# Patient Record
Sex: FEMALE | Race: WHITE | Employment: OTHER | URBAN - METROPOLITAN AREA
[De-identification: names, ages, dates, MRNs, and addresses within clinical notes are randomized per-mention and may not be internally consistent; named-entity substitution may affect disease eponyms.]

---

## 2021-01-20 ENCOUNTER — IMMUNIZATION (OUTPATIENT)
Dept: PRIMARY CARE CLINIC | Age: 86
End: 2021-01-20
Payer: MEDICARE

## 2021-01-20 PROCEDURE — 0001A COVID-19, PFIZER VACCINE 30MCG/0.3ML DOSE: CPT | Performed by: NURSE PRACTITIONER

## 2021-01-20 PROCEDURE — 91300 COVID-19, PFIZER VACCINE 30MCG/0.3ML DOSE: CPT | Performed by: NURSE PRACTITIONER

## 2021-02-10 ENCOUNTER — IMMUNIZATION (OUTPATIENT)
Dept: PRIMARY CARE CLINIC | Age: 86
End: 2021-02-10
Payer: MEDICARE

## 2021-02-10 PROCEDURE — 91300 COVID-19, PFIZER VACCINE 30MCG/0.3ML DOSE: CPT | Performed by: NURSE PRACTITIONER

## 2021-02-10 PROCEDURE — 0002A COVID-19, PFIZER VACCINE 30MCG/0.3ML DOSE: CPT | Performed by: NURSE PRACTITIONER

## 2021-04-02 ENCOUNTER — HOSPITAL ENCOUNTER (INPATIENT)
Age: 86
LOS: 6 days | Discharge: NURSING, REHAB OR LONG-TERM CARE | DRG: 481 | End: 2021-04-08
Attending: EMERGENCY MEDICINE | Admitting: INTERNAL MEDICINE
Payer: MEDICARE

## 2021-04-02 ENCOUNTER — APPOINTMENT (OUTPATIENT)
Dept: GENERAL RADIOLOGY | Age: 86
DRG: 481 | End: 2021-04-02
Payer: MEDICARE

## 2021-04-02 DIAGNOSIS — S72.002A CLOSED LEFT HIP FRACTURE, INITIAL ENCOUNTER (HCC): ICD-10-CM
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
## 2021-04-02 DIAGNOSIS — S72.002A CLOSED FRACTURE OF LEFT HIP, INITIAL ENCOUNTER (HCC): ICD-10-CM

## 2021-04-02 LAB
ABO/RH: NORMAL
ALBUMIN SERPL-MCNC: 4 G/DL (ref 3.5–5.2)
ALP BLD-CCNC: 51 U/L (ref 35–104)
ALT SERPL-CCNC: 11 U/L (ref 0–32)
ANION GAP SERPL CALCULATED.3IONS-SCNC: 10 MMOL/L (ref 7–16)
ANTIBODY SCREEN: NORMAL
APTT: 36.7 SEC (ref 24.5–35.1)
AST SERPL-CCNC: 16 U/L (ref 0–31)
BASOPHILS ABSOLUTE: 0.05 E9/L (ref 0–0.2)
BASOPHILS RELATIVE PERCENT: 0.6 % (ref 0–2)
BILIRUB SERPL-MCNC: 0.4 MG/DL (ref 0–1.2)
BUN BLDV-MCNC: 11 MG/DL (ref 8–23)
CALCIUM SERPL-MCNC: 8.5 MG/DL (ref 8.6–10.2)
CHLORIDE BLD-SCNC: 104 MMOL/L (ref 98–107)
CO2: 24 MMOL/L (ref 22–29)
CREAT SERPL-MCNC: 0.8 MG/DL (ref 0.5–1)
EOSINOPHILS ABSOLUTE: 0.19 E9/L (ref 0.05–0.5)
EOSINOPHILS RELATIVE PERCENT: 2.3 % (ref 0–6)
GFR AFRICAN AMERICAN: >60
GFR NON-AFRICAN AMERICAN: >60 ML/MIN/1.73
GLUCOSE BLD-MCNC: 125 MG/DL (ref 74–99)
HCT VFR BLD CALC: 37.4 % (ref 34–48)
HEMOGLOBIN: 12.2 G/DL (ref 11.5–15.5)
IMMATURE GRANULOCYTES #: 0.02 E9/L
IMMATURE GRANULOCYTES %: 0.2 % (ref 0–5)
INR BLD: 1.4
LYMPHOCYTES ABSOLUTE: 1.01 E9/L (ref 1.5–4)
LYMPHOCYTES RELATIVE PERCENT: 12 % (ref 20–42)
MCH RBC QN AUTO: 30.7 PG (ref 26–35)
MCHC RBC AUTO-ENTMCNC: 32.6 % (ref 32–34.5)
MCV RBC AUTO: 94.2 FL (ref 80–99.9)
MONOCYTES ABSOLUTE: 0.79 E9/L (ref 0.1–0.95)
MONOCYTES RELATIVE PERCENT: 9.4 % (ref 2–12)
NEUTROPHILS ABSOLUTE: 6.35 E9/L (ref 1.8–7.3)
NEUTROPHILS RELATIVE PERCENT: 75.5 % (ref 43–80)
PDW BLD-RTO: 13.1 FL (ref 11.5–15)
PLATELET # BLD: 157 E9/L (ref 130–450)
PMV BLD AUTO: 10.6 FL (ref 7–12)
POTASSIUM REFLEX MAGNESIUM: 4.1 MMOL/L (ref 3.5–5)
PROTHROMBIN TIME: 15.2 SEC (ref 9.3–12.4)
RBC # BLD: 3.97 E12/L (ref 3.5–5.5)
REASON FOR REJECTION: NORMAL
REJECTED TEST: NORMAL
SODIUM BLD-SCNC: 138 MMOL/L (ref 132–146)
TOTAL PROTEIN: 6.9 G/DL (ref 6.4–8.3)
WBC # BLD: 8.4 E9/L (ref 4.5–11.5)

## 2021-04-02 PROCEDURE — 36415 COLL VENOUS BLD VENIPUNCTURE: CPT

## 2021-04-02 PROCEDURE — 73502 X-RAY EXAM HIP UNI 2-3 VIEWS: CPT

## 2021-04-02 PROCEDURE — 86901 BLOOD TYPING SEROLOGIC RH(D): CPT

## 2021-04-02 PROCEDURE — 99283 EMERGENCY DEPT VISIT LOW MDM: CPT

## 2021-04-02 PROCEDURE — 86850 RBC ANTIBODY SCREEN: CPT

## 2021-04-02 PROCEDURE — 6370000000 HC RX 637 (ALT 250 FOR IP): Performed by: INTERNAL MEDICINE

## 2021-04-02 PROCEDURE — 1200000000 HC SEMI PRIVATE

## 2021-04-02 PROCEDURE — 80053 COMPREHEN METABOLIC PANEL: CPT

## 2021-04-02 PROCEDURE — 2580000003 HC RX 258: Performed by: INTERNAL MEDICINE

## 2021-04-02 PROCEDURE — 85730 THROMBOPLASTIN TIME PARTIAL: CPT

## 2021-04-02 PROCEDURE — 71045 X-RAY EXAM CHEST 1 VIEW: CPT

## 2021-04-02 PROCEDURE — 85025 COMPLETE CBC W/AUTO DIFF WBC: CPT

## 2021-04-02 PROCEDURE — 86900 BLOOD TYPING SEROLOGIC ABO: CPT

## 2021-04-02 PROCEDURE — 73552 X-RAY EXAM OF FEMUR 2/>: CPT

## 2021-04-02 PROCEDURE — 6370000000 HC RX 637 (ALT 250 FOR IP): Performed by: NURSE PRACTITIONER

## 2021-04-02 PROCEDURE — 85610 PROTHROMBIN TIME: CPT

## 2021-04-02 RX ORDER — POLYETHYLENE GLYCOL 3350 17 G/17G
17 POWDER, FOR SOLUTION ORAL DAILY PRN
Status: DISCONTINUED | OUTPATIENT
Start: 2021-04-02 | End: 2021-04-08 | Stop reason: HOSPADM

## 2021-04-02 RX ORDER — SODIUM CHLORIDE 9 MG/ML
25 INJECTION, SOLUTION INTRAVENOUS PRN
Status: DISCONTINUED | OUTPATIENT
Start: 2021-04-02 | End: 2021-04-08 | Stop reason: HOSPADM

## 2021-04-02 RX ORDER — ACETAMINOPHEN 650 MG/1
650 SUPPOSITORY RECTAL EVERY 6 HOURS PRN
Status: DISCONTINUED | OUTPATIENT
Start: 2021-04-02 | End: 2021-04-03 | Stop reason: SDUPTHER

## 2021-04-02 RX ORDER — LANOLIN ALCOHOL/MO/W.PET/CERES
3 CREAM (GRAM) TOPICAL NIGHTLY PRN
Status: DISCONTINUED | OUTPATIENT
Start: 2021-04-02 | End: 2021-04-08 | Stop reason: HOSPADM

## 2021-04-02 RX ORDER — PROMETHAZINE HYDROCHLORIDE 25 MG/1
12.5 TABLET ORAL EVERY 6 HOURS PRN
Status: DISCONTINUED | OUTPATIENT
Start: 2021-04-02 | End: 2021-04-08 | Stop reason: HOSPADM

## 2021-04-02 RX ORDER — SODIUM CHLORIDE 0.9 % (FLUSH) 0.9 %
10 SYRINGE (ML) INJECTION EVERY 12 HOURS SCHEDULED
Status: DISCONTINUED | OUTPATIENT
Start: 2021-04-02 | End: 2021-04-08 | Stop reason: HOSPADM

## 2021-04-02 RX ORDER — OXYCODONE HYDROCHLORIDE 5 MG/1
5 TABLET ORAL EVERY 4 HOURS PRN
Status: DISCONTINUED | OUTPATIENT
Start: 2021-04-02 | End: 2021-04-03

## 2021-04-02 RX ORDER — ONDANSETRON 2 MG/ML
4 INJECTION INTRAMUSCULAR; INTRAVENOUS EVERY 6 HOURS PRN
Status: DISCONTINUED | OUTPATIENT
Start: 2021-04-02 | End: 2021-04-08 | Stop reason: HOSPADM

## 2021-04-02 RX ORDER — MORPHINE SULFATE 2 MG/ML
2 INJECTION, SOLUTION INTRAMUSCULAR; INTRAVENOUS EVERY 4 HOURS PRN
Status: DISCONTINUED | OUTPATIENT
Start: 2021-04-02 | End: 2021-04-08 | Stop reason: HOSPADM

## 2021-04-02 RX ORDER — SODIUM CHLORIDE 0.9 % (FLUSH) 0.9 %
10 SYRINGE (ML) INJECTION PRN
Status: DISCONTINUED | OUTPATIENT
Start: 2021-04-02 | End: 2021-04-08 | Stop reason: HOSPADM

## 2021-04-02 RX ORDER — ACETAMINOPHEN 325 MG/1
650 TABLET ORAL EVERY 6 HOURS PRN
Status: DISCONTINUED | OUTPATIENT
Start: 2021-04-02 | End: 2021-04-03 | Stop reason: SDUPTHER

## 2021-04-02 RX ORDER — LEVOTHYROXINE SODIUM 0.12 MG/1
62.5 TABLET ORAL DAILY
COMMUNITY
End: 2021-10-18

## 2021-04-02 RX ADMIN — ACETAMINOPHEN 650 MG: 325 TABLET ORAL at 22:29

## 2021-04-02 RX ADMIN — Medication 3 MG: at 22:29

## 2021-04-02 RX ADMIN — Medication 10 ML: at 22:32

## 2021-04-02 ASSESSMENT — PAIN DESCRIPTION - LOCATION: LOCATION: HIP

## 2021-04-02 ASSESSMENT — PAIN DESCRIPTION - FREQUENCY: FREQUENCY: INTERMITTENT

## 2021-04-02 ASSESSMENT — PAIN DESCRIPTION - PROGRESSION: CLINICAL_PROGRESSION: GRADUALLY WORSENING

## 2021-04-02 ASSESSMENT — PAIN DESCRIPTION - ORIENTATION: ORIENTATION: LEFT

## 2021-04-02 ASSESSMENT — PAIN DESCRIPTION - DESCRIPTORS: DESCRIPTORS: ACHING;DULL;DISCOMFORT

## 2021-04-02 ASSESSMENT — PAIN SCALES - GENERAL: PAINLEVEL_OUTOF10: 2

## 2021-04-02 NOTE — ED PROVIDER NOTES
Department of Emergency Medicine   ED  Provider Note  Admit Date/RoomTime: 4/2/2021  5:43 PM  ED Room: [facility]          History of Present Illness:  4/2/21, Time: 7:17 PM EDT  Chief Complaint   Patient presents with    Fall    Leg Swelling     fell at home,swelling and deformity,shortening to left upper leg                Chanda Mcguire is a 82 y.o. female presenting to the ED for fall. Patient mechanical fall at home. She did not hit her head, she did not lose conscious. She is on no anticoagulation. She does complain of a left hip pain, sharp, worse when she moves, improves with rest, does not rating her. EMS reports a deformity. She has not broken a bone before. Denies head pain, neck pain, nausea, vomit, fever, chills, cough, sputum, change in bowel or bladder, or any other symptoms or complaints. Review of Systems:   Pertinent positives and negatives are stated within HPI, all other systems reviewed and are negative.        --------------------------------------------- PAST HISTORY ---------------------------------------------  Past Medical History:  has no past medical history on file. Past Surgical History:  has no past surgical history on file. Social History:      Family History: family history is not on file. . Unless otherwise noted, family history is non contributory    The patients home medications have been reviewed. Allergies:  Albumen, egg; Doxycycline; Penicillins; and Sulfamethoxazole-trimethoprim        ---------------------------------------------------PHYSICAL EXAM--------------------------------------    Constitutional/General: Alert and oriented x3  Head: Normocephalic and atraumatic  Eyes: PERRL, EOMI, sclera non icteric  Mouth: Oropharynx clear, handling secretions, no trismus, no asymmetry of the posterior oropharynx or uvular edema  Neck: Supple, full ROM, no stridor, no meningeal signs  Respiratory: Lungs clear to auscultation bilaterally, no wheezes, rales, or rhonchi. Not in respiratory distress  Cardiovascular:  Regular rate. Regular rhythm. 2+ distal pulses. Equal extremity pulses. Chest: No chest wall tenderness  GI:  Abdomen Soft, Non tender, Non distended. No rebound, guarding, or rigidity. No pulsatile masses. Musculoskeletal: Moves all extremities x 3. Left lower extremity shortened externally rotated, dorsalis pedis 2+  Integument: skin warm and dry. No rashes. Neurologic: GCS 15, no focal deficits, symmetric strength 5/5 in the upper and lower extremities bilaterally  Psychiatric: Normal Affect          -------------------------------------------------- RESULTS -------------------------------------------------  I have personally reviewed all laboratory and imaging results for this patient. Results are listed below.      LABS: (Lab results interpreted by me)  Results for orders placed or performed during the hospital encounter of 04/02/21   CBC Auto Differential   Result Value Ref Range    WBC 8.4 4.5 - 11.5 E9/L    RBC 3.97 3.50 - 5.50 E12/L    Hemoglobin 12.2 11.5 - 15.5 g/dL    Hematocrit 37.4 34.0 - 48.0 %    MCV 94.2 80.0 - 99.9 fL    MCH 30.7 26.0 - 35.0 pg    MCHC 32.6 32.0 - 34.5 %    RDW 13.1 11.5 - 15.0 fL    Platelets 420 380 - 595 E9/L    MPV 10.6 7.0 - 12.0 fL    Neutrophils % 75.5 43.0 - 80.0 %    Immature Granulocytes % 0.2 0.0 - 5.0 %    Lymphocytes % 12.0 (L) 20.0 - 42.0 %    Monocytes % 9.4 2.0 - 12.0 %    Eosinophils % 2.3 0.0 - 6.0 %    Basophils % 0.6 0.0 - 2.0 %    Neutrophils Absolute 6.35 1.80 - 7.30 E9/L    Immature Granulocytes # 0.02 E9/L    Lymphocytes Absolute 1.01 (L) 1.50 - 4.00 E9/L    Monocytes Absolute 0.79 0.10 - 0.95 E9/L    Eosinophils Absolute 0.19 0.05 - 0.50 E9/L    Basophils Absolute 0.05 0.00 - 0.20 E9/L   Protime-INR   Result Value Ref Range    Protime 15.2 (H) 9.3 - 12.4 sec    INR 1.4    APTT   Result Value Ref Range    aPTT 36.7 (H) 24.5 - 35.1 sec   SPECIMEN REJECTION   Result Value Ref Range    Rejected Test cmp     Reason for Rejection see below    ,       RADIOLOGY:  Interpreted by Radiologist unless otherwise specified  XR HIP LEFT (2-3 VIEWS)   Final Result   1. Intratrochanteric fracture of the left femoral head/neck region. 2.  Mild left hip and left knee osteoarthritis. 3.  Osseous mineralization is decreased. XR FEMUR LEFT (MIN 2 VIEWS)   Final Result   1. Intratrochanteric fracture of the left femoral head/neck region. 2.  Mild left hip and left knee osteoarthritis. 3.  Osseous mineralization is decreased. XR CHEST PORTABLE   Final Result   Linear platelike densities at the left lung base, favor subsegmental   atelectasis, assuming the absence of infectious symptoms. EKG Interpretation  Interpreted by emergency department physician, Dr. Daysi Middleton         ------------------------- NURSING NOTES AND VITALS REVIEWED ---------------------------   The nursing notes within the ED encounter and vital signs as below have been reviewed by myself  BP (!) 140/71   Pulse 73   Temp 97.6 °F (36.4 °C) (Oral)   Resp 20   Ht 5' (1.524 m)   Wt 135 lb (61.2 kg)   SpO2 97%   BMI 26.37 kg/m²     Oxygen Saturation Interpretation: Normal    The patients available past medical records and past encounters were reviewed. ------------------------------ ED COURSE/MEDICAL DECISION MAKING----------------------  Medications - No data to display          Medical Decision Making:    Labs and imaging reviewed. Orthopedics consulted, patient will be admitted. Counseling: The emergency provider has spoken with the patient and discussed todays results, in addition to providing specific details for the plan of care and counseling regarding the diagnosis and prognosis. Questions are answered at this time and they are agreeable with the plan.       --------------------------------- IMPRESSION AND DISPOSITION ---------------------------------    IMPRESSION  1.  Fall, initial encounter    2. Closed fracture of left hip, initial encounter ([facility] [address]        DISPOSITION  Disposition: Admit to med/surg floor  Patient condition is stable        NOTE: This report was transcribed using voice recognition software.  Every effort was made to ensure accuracy; however, inadvertent computerized transcription errors may be present        Naveed Uribe MD  04/03/21 8855

## 2021-04-02 NOTE — ED NOTES
Bed: 36  Expected date:   Expected time:   Means of arrival:   Comments:  Raheel Espana RN  04/02/21 7321

## 2021-04-03 ENCOUNTER — ANESTHESIA (OUTPATIENT)
Dept: OPERATING ROOM | Age: 86
DRG: 481 | End: 2021-04-03
Payer: MEDICARE

## 2021-04-03 ENCOUNTER — APPOINTMENT (OUTPATIENT)
Dept: GENERAL RADIOLOGY | Age: 86
DRG: 481 | End: 2021-04-03
Payer: MEDICARE

## 2021-04-03 ENCOUNTER — ANESTHESIA EVENT (OUTPATIENT)
Dept: OPERATING ROOM | Age: 86
DRG: 481 | End: 2021-04-03
Payer: MEDICARE

## 2021-04-03 VITALS — OXYGEN SATURATION: 100 % | SYSTOLIC BLOOD PRESSURE: 136 MMHG | DIASTOLIC BLOOD PRESSURE: 101 MMHG | TEMPERATURE: 97.3 F

## 2021-04-03 PROBLEM — E03.9 HYPOTHYROID: Chronic | Status: ACTIVE | Noted: 2021-04-03

## 2021-04-03 LAB
ANION GAP SERPL CALCULATED.3IONS-SCNC: 11 MMOL/L (ref 7–16)
BASOPHILS ABSOLUTE: 0.02 E9/L (ref 0–0.2)
BASOPHILS RELATIVE PERCENT: 0.2 % (ref 0–2)
BUN BLDV-MCNC: 14 MG/DL (ref 8–23)
CALCIUM SERPL-MCNC: 9 MG/DL (ref 8.6–10.2)
CHLORIDE BLD-SCNC: 103 MMOL/L (ref 98–107)
CO2: 24 MMOL/L (ref 22–29)
CREAT SERPL-MCNC: 0.8 MG/DL (ref 0.5–1)
EOSINOPHILS ABSOLUTE: 0 E9/L (ref 0.05–0.5)
EOSINOPHILS RELATIVE PERCENT: 0 % (ref 0–6)
GFR AFRICAN AMERICAN: >60
GFR NON-AFRICAN AMERICAN: >60 ML/MIN/1.73
GLUCOSE BLD-MCNC: 129 MG/DL (ref 74–99)
HCT VFR BLD CALC: 32.9 % (ref 34–48)
HEMOGLOBIN: 11.1 G/DL (ref 11.5–15.5)
IMMATURE GRANULOCYTES #: 0.06 E9/L
IMMATURE GRANULOCYTES %: 0.5 % (ref 0–5)
LYMPHOCYTES ABSOLUTE: 0.89 E9/L (ref 1.5–4)
LYMPHOCYTES RELATIVE PERCENT: 7.3 % (ref 20–42)
MCH RBC QN AUTO: 31.8 PG (ref 26–35)
MCHC RBC AUTO-ENTMCNC: 33.7 % (ref 32–34.5)
MCV RBC AUTO: 94.3 FL (ref 80–99.9)
MONOCYTES ABSOLUTE: 1.11 E9/L (ref 0.1–0.95)
MONOCYTES RELATIVE PERCENT: 9.1 % (ref 2–12)
NEUTROPHILS ABSOLUTE: 10.16 E9/L (ref 1.8–7.3)
NEUTROPHILS RELATIVE PERCENT: 82.9 % (ref 43–80)
PDW BLD-RTO: 12.8 FL (ref 11.5–15)
PLATELET # BLD: 175 E9/L (ref 130–450)
PMV BLD AUTO: 10.4 FL (ref 7–12)
POTASSIUM REFLEX MAGNESIUM: 4.3 MMOL/L (ref 3.5–5)
RBC # BLD: 3.49 E12/L (ref 3.5–5.5)
SODIUM BLD-SCNC: 138 MMOL/L (ref 132–146)
VITAMIN D 25-HYDROXY: 20 NG/ML (ref 30–100)
WBC # BLD: 12.2 E9/L (ref 4.5–11.5)

## 2021-04-03 PROCEDURE — 2500000003 HC RX 250 WO HCPCS: Performed by: ORTHOPAEDIC SURGERY

## 2021-04-03 PROCEDURE — 82306 VITAMIN D 25 HYDROXY: CPT

## 2021-04-03 PROCEDURE — 27245 TREAT THIGH FRACTURE: CPT | Performed by: ORTHOPAEDIC SURGERY

## 2021-04-03 PROCEDURE — 99222 1ST HOSP IP/OBS MODERATE 55: CPT | Performed by: ORTHOPAEDIC SURGERY

## 2021-04-03 PROCEDURE — 2709999900 HC NON-CHARGEABLE SUPPLY: Performed by: ORTHOPAEDIC SURGERY

## 2021-04-03 PROCEDURE — 2580000003 HC RX 258: Performed by: INTERNAL MEDICINE

## 2021-04-03 PROCEDURE — 7100000000 HC PACU RECOVERY - FIRST 15 MIN: Performed by: ORTHOPAEDIC SURGERY

## 2021-04-03 PROCEDURE — 3600000005 HC SURGERY LEVEL 5 BASE: Performed by: ORTHOPAEDIC SURGERY

## 2021-04-03 PROCEDURE — 3700000000 HC ANESTHESIA ATTENDED CARE: Performed by: ORTHOPAEDIC SURGERY

## 2021-04-03 PROCEDURE — 36415 COLL VENOUS BLD VENIPUNCTURE: CPT

## 2021-04-03 PROCEDURE — 2580000003 HC RX 258: Performed by: STUDENT IN AN ORGANIZED HEALTH CARE EDUCATION/TRAINING PROGRAM

## 2021-04-03 PROCEDURE — 6360000002 HC RX W HCPCS

## 2021-04-03 PROCEDURE — 3209999900 FLUORO FOR SURGICAL PROCEDURES

## 2021-04-03 PROCEDURE — 2500000003 HC RX 250 WO HCPCS: Performed by: STUDENT IN AN ORGANIZED HEALTH CARE EDUCATION/TRAINING PROGRAM

## 2021-04-03 PROCEDURE — 2580000003 HC RX 258: Performed by: ORTHOPAEDIC SURGERY

## 2021-04-03 PROCEDURE — C1769 GUIDE WIRE: HCPCS | Performed by: ORTHOPAEDIC SURGERY

## 2021-04-03 PROCEDURE — 3600000015 HC SURGERY LEVEL 5 ADDTL 15MIN: Performed by: ORTHOPAEDIC SURGERY

## 2021-04-03 PROCEDURE — 85025 COMPLETE CBC W/AUTO DIFF WBC: CPT

## 2021-04-03 PROCEDURE — 2720000010 HC SURG SUPPLY STERILE: Performed by: ORTHOPAEDIC SURGERY

## 2021-04-03 PROCEDURE — 6370000000 HC RX 637 (ALT 250 FOR IP): Performed by: STUDENT IN AN ORGANIZED HEALTH CARE EDUCATION/TRAINING PROGRAM

## 2021-04-03 PROCEDURE — 80048 BASIC METABOLIC PNL TOTAL CA: CPT

## 2021-04-03 PROCEDURE — C1713 ANCHOR/SCREW BN/BN,TIS/BN: HCPCS | Performed by: ORTHOPAEDIC SURGERY

## 2021-04-03 PROCEDURE — 3700000001 HC ADD 15 MINUTES (ANESTHESIA): Performed by: ORTHOPAEDIC SURGERY

## 2021-04-03 PROCEDURE — 2500000003 HC RX 250 WO HCPCS

## 2021-04-03 PROCEDURE — 6360000002 HC RX W HCPCS: Performed by: STUDENT IN AN ORGANIZED HEALTH CARE EDUCATION/TRAINING PROGRAM

## 2021-04-03 PROCEDURE — 0QS706Z REPOSITION LEFT UPPER FEMUR WITH INTRAMEDULLARY INTERNAL FIXATION DEVICE, OPEN APPROACH: ICD-10-PCS | Performed by: STUDENT IN AN ORGANIZED HEALTH CARE EDUCATION/TRAINING PROGRAM

## 2021-04-03 PROCEDURE — 73502 X-RAY EXAM HIP UNI 2-3 VIEWS: CPT

## 2021-04-03 PROCEDURE — 93005 ELECTROCARDIOGRAM TRACING: CPT | Performed by: ORTHOPAEDIC SURGERY

## 2021-04-03 PROCEDURE — 1200000000 HC SEMI PRIVATE

## 2021-04-03 PROCEDURE — 7100000001 HC PACU RECOVERY - ADDTL 15 MIN: Performed by: ORTHOPAEDIC SURGERY

## 2021-04-03 DEVICE — SCREW BNE L95MM DIA10.35MM PROX FEM G TI CANN FOR TFN ADV: Type: IMPLANTABLE DEVICE | Site: HIP | Status: FUNCTIONAL

## 2021-04-03 DEVICE — SCREW BNE L36MM DIA5MM TIB LT GRN TI ST CANN LOK FULL THRD: Type: IMPLANTABLE DEVICE | Site: HIP | Status: FUNCTIONAL

## 2021-04-03 DEVICE — NAIL IM L170MM DIA11MM NK 125DEG SHT PROX FEM GRN TI-15MO: Type: IMPLANTABLE DEVICE | Site: HIP | Status: FUNCTIONAL

## 2021-04-03 RX ORDER — ONDANSETRON 2 MG/ML
INJECTION INTRAMUSCULAR; INTRAVENOUS PRN
Status: DISCONTINUED | OUTPATIENT
Start: 2021-04-03 | End: 2021-04-03 | Stop reason: SDUPTHER

## 2021-04-03 RX ORDER — SODIUM CHLORIDE, SODIUM LACTATE, POTASSIUM CHLORIDE, CALCIUM CHLORIDE 600; 310; 30; 20 MG/100ML; MG/100ML; MG/100ML; MG/100ML
INJECTION, SOLUTION INTRAVENOUS CONTINUOUS
Status: DISCONTINUED | OUTPATIENT
Start: 2021-04-03 | End: 2021-04-05

## 2021-04-03 RX ORDER — HYDROCODONE BITARTRATE AND ACETAMINOPHEN 5; 325 MG/1; MG/1
2 TABLET ORAL EVERY 4 HOURS PRN
Status: DISCONTINUED | OUTPATIENT
Start: 2021-04-03 | End: 2021-04-08 | Stop reason: HOSPADM

## 2021-04-03 RX ORDER — GLYCOPYRROLATE 1 MG/5 ML
SYRINGE (ML) INTRAVENOUS PRN
Status: DISCONTINUED | OUTPATIENT
Start: 2021-04-03 | End: 2021-04-03 | Stop reason: SDUPTHER

## 2021-04-03 RX ORDER — LEVOTHYROXINE SODIUM 0.12 MG/1
62.5 TABLET ORAL DAILY
Status: DISCONTINUED | OUTPATIENT
Start: 2021-04-04 | End: 2021-04-08 | Stop reason: HOSPADM

## 2021-04-03 RX ORDER — PROMETHAZINE HYDROCHLORIDE 25 MG/ML
6.25 INJECTION, SOLUTION INTRAMUSCULAR; INTRAVENOUS
Status: DISCONTINUED | OUTPATIENT
Start: 2021-04-03 | End: 2021-04-03 | Stop reason: HOSPADM

## 2021-04-03 RX ORDER — HYDROCODONE BITARTRATE AND ACETAMINOPHEN 5; 325 MG/1; MG/1
1 TABLET ORAL EVERY 6 HOURS PRN
Qty: 28 TABLET | Refills: 0 | Status: SHIPPED | OUTPATIENT
Start: 2021-04-03 | End: 2021-04-10

## 2021-04-03 RX ORDER — ACETAMINOPHEN 500 MG
500 TABLET ORAL EVERY 4 HOURS PRN
Status: DISCONTINUED | OUTPATIENT
Start: 2021-04-03 | End: 2021-04-08 | Stop reason: HOSPADM

## 2021-04-03 RX ORDER — FENTANYL CITRATE 50 UG/ML
INJECTION, SOLUTION INTRAMUSCULAR; INTRAVENOUS PRN
Status: DISCONTINUED | OUTPATIENT
Start: 2021-04-03 | End: 2021-04-03 | Stop reason: SDUPTHER

## 2021-04-03 RX ORDER — DEXAMETHASONE SODIUM PHOSPHATE 10 MG/ML
INJECTION INTRAMUSCULAR; INTRAVENOUS PRN
Status: DISCONTINUED | OUTPATIENT
Start: 2021-04-03 | End: 2021-04-03 | Stop reason: SDUPTHER

## 2021-04-03 RX ORDER — ROCURONIUM BROMIDE 10 MG/ML
INJECTION, SOLUTION INTRAVENOUS PRN
Status: DISCONTINUED | OUTPATIENT
Start: 2021-04-03 | End: 2021-04-03 | Stop reason: SDUPTHER

## 2021-04-03 RX ORDER — KETOROLAC TROMETHAMINE 30 MG/ML
15 INJECTION, SOLUTION INTRAMUSCULAR; INTRAVENOUS EVERY 6 HOURS PRN
Status: ACTIVE | OUTPATIENT
Start: 2021-04-03 | End: 2021-04-08

## 2021-04-03 RX ORDER — CEFAZOLIN SODIUM 1 G/3ML
INJECTION, POWDER, FOR SOLUTION INTRAMUSCULAR; INTRAVENOUS PRN
Status: DISCONTINUED | OUTPATIENT
Start: 2021-04-03 | End: 2021-04-03 | Stop reason: SDUPTHER

## 2021-04-03 RX ORDER — ONDANSETRON 2 MG/ML
4 INJECTION INTRAMUSCULAR; INTRAVENOUS
Status: DISCONTINUED | OUTPATIENT
Start: 2021-04-03 | End: 2021-04-03 | Stop reason: HOSPADM

## 2021-04-03 RX ORDER — ASPIRIN 81 MG/1
81 TABLET ORAL 2 TIMES DAILY
Qty: 56 TABLET | Refills: 0 | Status: SHIPPED | OUTPATIENT
Start: 2021-04-03 | End: 2021-05-28

## 2021-04-03 RX ORDER — HYDROCODONE BITARTRATE AND ACETAMINOPHEN 5; 325 MG/1; MG/1
1 TABLET ORAL EVERY 4 HOURS PRN
Status: DISCONTINUED | OUTPATIENT
Start: 2021-04-03 | End: 2021-04-08 | Stop reason: HOSPADM

## 2021-04-03 RX ORDER — ETOMIDATE 2 MG/ML
INJECTION INTRAVENOUS PRN
Status: DISCONTINUED | OUTPATIENT
Start: 2021-04-03 | End: 2021-04-03 | Stop reason: SDUPTHER

## 2021-04-03 RX ORDER — NEOSTIGMINE METHYLSULFATE 1 MG/ML
INJECTION, SOLUTION INTRAVENOUS PRN
Status: DISCONTINUED | OUTPATIENT
Start: 2021-04-03 | End: 2021-04-03 | Stop reason: SDUPTHER

## 2021-04-03 RX ADMIN — ETOMIDATE 16 MG: 2 INJECTION, SOLUTION INTRAVENOUS at 10:41

## 2021-04-03 RX ADMIN — FENTANYL CITRATE 100 MCG: 50 INJECTION, SOLUTION INTRAMUSCULAR; INTRAVENOUS at 10:41

## 2021-04-03 RX ADMIN — CEFAZOLIN 2000 MG: 1 INJECTION, POWDER, FOR SOLUTION INTRAMUSCULAR; INTRAVENOUS at 10:56

## 2021-04-03 RX ADMIN — Medication 2000 MG: at 18:00

## 2021-04-03 RX ADMIN — TRANEXAMIC ACID 1000 MG: 1 INJECTION, SOLUTION INTRAVENOUS at 17:20

## 2021-04-03 RX ADMIN — ACETAMINOPHEN 500 MG: 500 TABLET, FILM COATED ORAL at 21:38

## 2021-04-03 RX ADMIN — Medication 3 MG: at 11:46

## 2021-04-03 RX ADMIN — FENTANYL CITRATE 50 MCG: 50 INJECTION, SOLUTION INTRAMUSCULAR; INTRAVENOUS at 11:12

## 2021-04-03 RX ADMIN — SODIUM CHLORIDE, POTASSIUM CHLORIDE, SODIUM LACTATE AND CALCIUM CHLORIDE: 600; 310; 30; 20 INJECTION, SOLUTION INTRAVENOUS at 21:38

## 2021-04-03 RX ADMIN — SODIUM CHLORIDE, POTASSIUM CHLORIDE, SODIUM LACTATE AND CALCIUM CHLORIDE: 600; 310; 30; 20 INJECTION, SOLUTION INTRAVENOUS at 10:37

## 2021-04-03 RX ADMIN — DEXAMETHASONE SODIUM PHOSPHATE 10 MG: 10 INJECTION INTRAMUSCULAR; INTRAVENOUS at 10:41

## 2021-04-03 RX ADMIN — ONDANSETRON HYDROCHLORIDE 4 MG: 2 INJECTION, SOLUTION INTRAMUSCULAR; INTRAVENOUS at 11:43

## 2021-04-03 RX ADMIN — Medication 0.6 MG: at 11:46

## 2021-04-03 RX ADMIN — TRANEXAMIC ACID 1 G: 1 INJECTION, SOLUTION INTRAVENOUS at 11:03

## 2021-04-03 RX ADMIN — ROCURONIUM BROMIDE 50 MG: 10 INJECTION, SOLUTION INTRAVENOUS at 10:41

## 2021-04-03 ASSESSMENT — PAIN SCALES - GENERAL
PAINLEVEL_OUTOF10: 0
PAINLEVEL_OUTOF10: 2
PAINLEVEL_OUTOF10: 2
PAINLEVEL_OUTOF10: 4
PAINLEVEL_OUTOF10: 0

## 2021-04-03 ASSESSMENT — PULMONARY FUNCTION TESTS
PIF_VALUE: 20
PIF_VALUE: 19
PIF_VALUE: 19
PIF_VALUE: 21
PIF_VALUE: 20
PIF_VALUE: 19
PIF_VALUE: 18
PIF_VALUE: 17
PIF_VALUE: 18
PIF_VALUE: 17
PIF_VALUE: 19
PIF_VALUE: 21
PIF_VALUE: 20
PIF_VALUE: 19
PIF_VALUE: 18
PIF_VALUE: 19
PIF_VALUE: 7
PIF_VALUE: 19
PIF_VALUE: 20
PIF_VALUE: 19
PIF_VALUE: 19
PIF_VALUE: 7
PIF_VALUE: 19
PIF_VALUE: 24
PIF_VALUE: 20
PIF_VALUE: 19
PIF_VALUE: 20
PIF_VALUE: 18
PIF_VALUE: 5
PIF_VALUE: 19
PIF_VALUE: 18
PIF_VALUE: 17
PIF_VALUE: 18
PIF_VALUE: 2

## 2021-04-03 ASSESSMENT — PAIN DESCRIPTION - ORIENTATION
ORIENTATION: LEFT
ORIENTATION: LEFT

## 2021-04-03 ASSESSMENT — PAIN DESCRIPTION - LOCATION: LOCATION: HIP

## 2021-04-03 ASSESSMENT — PAIN DESCRIPTION - PAIN TYPE: TYPE: ACUTE PAIN

## 2021-04-03 ASSESSMENT — PAIN DESCRIPTION - PROGRESSION: CLINICAL_PROGRESSION: NOT CHANGED

## 2021-04-03 NOTE — PLAN OF CARE
Problem: Pain:  Goal: Pain level will decrease  Description: Pain level will decrease  4/3/2021 0139 by Abena De La Cruz RN  Outcome: Met This Shift     Problem: Pain:  Goal: Control of acute pain  Description: Control of acute pain  4/3/2021 0139 by Abena De La Cruz RN  Outcome: Met This Shift

## 2021-04-03 NOTE — ANESTHESIA PRE PROCEDURE
Swelling    Doxycycline Rash    Penicillins Rash and Hives    Sulfamethoxazole-Trimethoprim Rash       Problem List:    Patient Active Problem List   Diagnosis Code    [facility] S72.002A    Hypothyroid E03.9       Past Medical History:  No past medical history on file. Past Surgical History:  No past surgical history on file. Social History:    Social History     Tobacco Use    Smoking status: Never Smoker    Smokeless tobacco: Never Used   Substance Use Topics    Alcohol use: Not on file                                Counseling given: Not Answered      Vital Signs (Current):   Vitals:    04/02/21 2130 04/03/21 0000 04/03/21 0315 04/03/21 0723   BP: 119/66 120/66 116/60 121/77   Pulse: 89 76 91 86   Resp: 18 16 17 16   Temp: 97.7 °F (36.5 °C) 97.5 °F (36.4 °C) 98.9 °F (37.2 °C) 99.2 °F (37.3 °C)   TempSrc: Temporal Temporal Temporal Temporal   SpO2: 94% 98% 94% 97%   Weight:       Height:                                                  BP Readings from Last 3 Encounters:   04/03/21 121/77       NPO Status:                                                                                 BMI:   Wt Readings from Last 3 Encounters:   04/02/21 135 lb (61.2 kg)     Body mass index is 26.37 kg/m².     CBC:   Lab Results   Component Value Date    WBC 12.2 04/03/2021    RBC 3.49 04/03/2021    HGB 11.1 04/03/2021    HCT 32.9 04/03/2021    MCV 94.3 04/03/2021    RDW 12.8 04/03/2021     04/03/2021       CMP:   Lab Results   Component Value Date     04/03/2021    K 4.3 04/03/2021     04/03/2021    CO2 24 04/03/2021    BUN 14 04/03/2021    CREATININE 0.8 04/03/2021    GFRAA >60 04/03/2021    LABGLOM >60 04/03/2021    GLUCOSE 129 04/03/2021    PROT 6.9 04/02/2021    CALCIUM 9.0 04/03/2021    BILITOT 0.4 04/02/2021    ALKPHOS 51 04/02/2021    AST 16 04/02/2021    ALT 11 04/02/2021       POC Tests: No results for input(s): POCGLU, POCNA, POCK, POCCL, POCBUN,

## 2021-04-03 NOTE — OP NOTE
Operative Note      Patient: Leslie Page  YOB: 1935  MRN: [id number]    Date of Procedure: 4/3/2021    Pre-Op Diagnosis: Left Intertrochanteric Femur Fracture     Post-Op Diagnosis: Same       Procedure(s):  HIP, CEPHALOMEDULLARY NAILING    Surgeon(s):  Osito Hackett MD    Assistant:   Resident: eTrrence Cabrera DO; Shakir Yusuf DO; Trey Thomason DO    Anesthesia: General    Specimens:   * No specimens in log *    Implants:  Implant Name Type Inv. Item Serial No.  Lot No. LRB No. Used Action   NAIL IM L170MM [id number] 125DEG SHT PROX FEM GRN TI-15MO  NAIL IM L170MM [id number] NK 125DEG SHT PROX FEM GRN TI-15MO  DEPU[employer] [place]- [id number] Left 1 Implanted   SCREW BNE L95MM DIA10.35MM PROX FEM G TI TOY FOR TFN ADV  SCREW BNE L95MM DIA10.35MM PROX FEM G TI TOY FOR TFN ADV  DEPU[employer]- [id number] Left 1 Implanted   SCREW BNE L36MM DIA5MM TIB LT GRN TI ST TOY JUDY FULL THRD  SCREW BNE L36MM DIA5MM TIB LT GRN TI ST TOY JUDY FULL THRD  DEPU[employer]- [id number] Left 1 Implanted         Drains:   Urethral Catheter Non-latex 16 fr (Active)   Catheter Indications Perioperative use in selected surgeries including but not limited to urologic, pelvic or need for intraoperative monitoring of urinary output due to prolonged surgery, large volume infusion or need for diuretic therapy in surgery 04/03/21 0728   Urine Color Yellow 04/03/21 0728   Urine Appearance Clear 04/03/21 0728   Output (mL) 350 mL 04/03/21 0547     OPERATIVE INDICATIONS:  The patient is a 82year old female who suffered a fall from standing height. She was found to have a left intertrochanteric femur fracture. It was recommended the patient undergo operative fixation to allow early mobilization and weight bearing. The risks, benefits, and alternatives to the procedure were discussed at length with the patient and his family members.   These risks include, but are not limited to infection, nerve and/or pin, with soft tissue protector in place. The reamer was then advanced to below the level of the lesser trochanter. A size 11x170 mm, 125 degree Synthes TFNA was chosen. The nail was assembled on the  handle and advanced by hand to the appropriate level. As the nail was inserted, fluoscopy images were obtained intermittently. Once the nail was advanced to a level that would allow appropriate lag screw placement into the center of the femoral head, along the medial calcar, the trocar sleeve for the lag screw was placed and skin incision made. The trocar was placed down on bone and the drill guide pin was placed through the nail into the center of the head on AP and lateral views. The pin was inserted to ensure tip apex distance less than 25 mm as measured on AP and lateral views by adding the distance from pin tip to edge of femoral head. The screw length was then determined to be 95 MM. The drill for the lag screw was set to appropriate depth and the drill was advanced to a positive stop. The drill was removed, and a lag screw size 95 MM was placed by hand. Final position was confirmed on AP and lateral views. The set screw was placed and the screw  and trocar were removed. The insertion handle was removed. Next, the insertion handle of the nail was adjusted to place our distal interlock screw in the static position. The skin was incised and trocar placed down on bone. The femoral shaft was drilled and measured and a size 5 x 36 mm interlocking screw was placed by hand. Final images of the hip, fracture site, and distal extent of the nail were obtained. The wounds were copiously irrigated with lactated ringers. The proximal wound was closed via O vicryl for fascia, 2-O vicryl for subcutaneus layer, and staples for skin. The lag screw and distal interlock incisions were closed with subcutaneous 2-O vicryl and staples. Sterile dressing was applied.     The patient was transferred off of the fracture table and back onto the hospital bed. The patient was awoken from anesthesia and taken to the PACU in stable condition. COMPLICATIONS: none    POST OPERATIVE PLAN: The patient will be transferred to the orthopaedic floor from PACU once stable. Post operative antibiotics will be continued for 24 hours. Physical therapy will begin immediately, with weight bearing as tolerated. DVT prophylaxis will be with SCD's starting today, and 81 mg aspirin BID tomorrow. I anticipate discharge to home/rehab within the first 3 post operative days.      Lily Cardozo MD  Orthopaedic Surgery   4/3/21    Electronically signed by Jack Smith DO on 4/3/2021 at 12:11 PM
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
## 2021-04-03 NOTE — ANESTHESIA POSTPROCEDURE EVALUATION
Department of Anesthesiology  Postprocedure Note    Patient: Aarti Harris  MRN: [id number]  YOB: 1935  Date of evaluation: 4/3/2021  Time:  1:21 PM     Procedure Summary     Date: 04/03/21 Room / Location: [facility] / [facility]    Anesthesia Start: 4058 Anesthesia Stop: 5639    Procedure: HIP, CEPHALOMEDULLARY NAILING (Left Hip) Diagnosis: ([facility])    Surgeons: Van Castleman, MD Responsible Provider: Demian Finnegan MD    Anesthesia Type: general ASA Status: 3          Anesthesia Type: general    Coral Phase I: Coral Score: 8    Coral Phase II:      Last vitals: Reviewed and per EMR flowsheets.        Anesthesia Post Evaluation    Patient location during evaluation: PACU  Patient participation: complete - patient participated  Level of consciousness: awake  Pain score: 0  Airway patency: patent  Nausea & Vomiting: no nausea  Complications: no  Cardiovascular status: hemodynamically stable  Respiratory status: acceptable  Hydration status: stable
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
## 2021-04-03 NOTE — PROGRESS NOTES
Admitted to pacu from or  Cardiac monitor lead 2 sr see strip sheet    Dressing dry and intact, resting quietly  Family unavailable in lobby  xrays called for

## 2021-04-04 PROBLEM — E55.9 HYPOVITAMINOSIS D: Chronic | Status: ACTIVE | Noted: 2021-04-04

## 2021-04-04 LAB
ANION GAP SERPL CALCULATED.3IONS-SCNC: 9 MMOL/L (ref 7–16)
ANISOCYTOSIS: ABNORMAL
BASOPHILS ABSOLUTE: 0 E9/L (ref 0–0.2)
BASOPHILS RELATIVE PERCENT: 0.2 % (ref 0–2)
BUN BLDV-MCNC: 12 MG/DL (ref 8–23)
BURR CELLS: ABNORMAL
CALCIUM SERPL-MCNC: 8.9 MG/DL (ref 8.6–10.2)
CHLORIDE BLD-SCNC: 105 MMOL/L (ref 98–107)
CO2: 26 MMOL/L (ref 22–29)
CREAT SERPL-MCNC: 0.8 MG/DL (ref 0.5–1)
EOSINOPHILS ABSOLUTE: 0 E9/L (ref 0.05–0.5)
EOSINOPHILS RELATIVE PERCENT: 0.1 % (ref 0–6)
GFR AFRICAN AMERICAN: >60
GFR NON-AFRICAN AMERICAN: >60 ML/MIN/1.73
GLUCOSE BLD-MCNC: 110 MG/DL (ref 74–99)
HCT VFR BLD CALC: 32.4 % (ref 34–48)
HEMOGLOBIN: 10.4 G/DL (ref 11.5–15.5)
HYPOCHROMIA: ABNORMAL
LYMPHOCYTES ABSOLUTE: 1.2 E9/L (ref 1.5–4)
LYMPHOCYTES RELATIVE PERCENT: 8.7 % (ref 20–42)
MCH RBC QN AUTO: 31 PG (ref 26–35)
MCHC RBC AUTO-ENTMCNC: 32.1 % (ref 32–34.5)
MCV RBC AUTO: 96.4 FL (ref 80–99.9)
MONOCYTES ABSOLUTE: 1.73 E9/L (ref 0.1–0.95)
MONOCYTES RELATIVE PERCENT: 13 % (ref 2–12)
NEUTROPHILS ABSOLUTE: 10.37 E9/L (ref 1.8–7.3)
NEUTROPHILS RELATIVE PERCENT: 78.3 % (ref 43–80)
OVALOCYTES: ABNORMAL
PDW BLD-RTO: 13 FL (ref 11.5–15)
PLATELET # BLD: 173 E9/L (ref 130–450)
PMV BLD AUTO: 10.5 FL (ref 7–12)
POIKILOCYTES: ABNORMAL
POLYCHROMASIA: ABNORMAL
POTASSIUM SERPL-SCNC: 3.9 MMOL/L (ref 3.5–5)
RBC # BLD: 3.36 E12/L (ref 3.5–5.5)
SODIUM BLD-SCNC: 140 MMOL/L (ref 132–146)
WBC # BLD: 13.3 E9/L (ref 4.5–11.5)

## 2021-04-04 PROCEDURE — 6370000000 HC RX 637 (ALT 250 FOR IP): Performed by: INTERNAL MEDICINE

## 2021-04-04 PROCEDURE — 1200000000 HC SEMI PRIVATE

## 2021-04-04 PROCEDURE — 6360000002 HC RX W HCPCS: Performed by: STUDENT IN AN ORGANIZED HEALTH CARE EDUCATION/TRAINING PROGRAM

## 2021-04-04 PROCEDURE — 6370000000 HC RX 637 (ALT 250 FOR IP): Performed by: STUDENT IN AN ORGANIZED HEALTH CARE EDUCATION/TRAINING PROGRAM

## 2021-04-04 PROCEDURE — 2580000003 HC RX 258: Performed by: STUDENT IN AN ORGANIZED HEALTH CARE EDUCATION/TRAINING PROGRAM

## 2021-04-04 PROCEDURE — 80048 BASIC METABOLIC PNL TOTAL CA: CPT

## 2021-04-04 PROCEDURE — 36415 COLL VENOUS BLD VENIPUNCTURE: CPT

## 2021-04-04 PROCEDURE — 85025 COMPLETE CBC W/AUTO DIFF WBC: CPT

## 2021-04-04 PROCEDURE — 97161 PT EVAL LOW COMPLEX 20 MIN: CPT

## 2021-04-04 RX ORDER — OYSTER SHELL CALCIUM WITH VITAMIN D 500; 200 MG/1; [IU]/1
2 TABLET, FILM COATED ORAL DAILY
Status: DISCONTINUED | OUTPATIENT
Start: 2021-04-04 | End: 2021-04-08 | Stop reason: HOSPADM

## 2021-04-04 RX ORDER — SENNA AND DOCUSATE SODIUM 50; 8.6 MG/1; MG/1
2 TABLET, FILM COATED ORAL DAILY PRN
Status: DISCONTINUED | OUTPATIENT
Start: 2021-04-04 | End: 2021-04-05

## 2021-04-04 RX ORDER — ERGOCALCIFEROL 1.25 MG/1
50000 CAPSULE ORAL WEEKLY
Status: DISCONTINUED | OUTPATIENT
Start: 2021-04-04 | End: 2021-04-04

## 2021-04-04 RX ADMIN — Medication 2000 MG: at 04:59

## 2021-04-04 RX ADMIN — ACETAMINOPHEN 500 MG: 500 TABLET, FILM COATED ORAL at 23:43

## 2021-04-04 RX ADMIN — ACETAMINOPHEN 500 MG: 500 TABLET, FILM COATED ORAL at 19:03

## 2021-04-04 RX ADMIN — SODIUM CHLORIDE, POTASSIUM CHLORIDE, SODIUM LACTATE AND CALCIUM CHLORIDE: 600; 310; 30; 20 INJECTION, SOLUTION INTRAVENOUS at 11:29

## 2021-04-04 RX ADMIN — LEVOTHYROXINE SODIUM 62.5 MCG: 0.12 TABLET ORAL at 06:11

## 2021-04-04 RX ADMIN — ENOXAPARIN SODIUM 40 MG: 40 INJECTION SUBCUTANEOUS at 08:49

## 2021-04-04 ASSESSMENT — PAIN SCALES - GENERAL
PAINLEVEL_OUTOF10: 2
PAINLEVEL_OUTOF10: 0
PAINLEVEL_OUTOF10: 2

## 2021-04-04 ASSESSMENT — PAIN DESCRIPTION - PAIN TYPE
TYPE: ACUTE PAIN
TYPE: ACUTE PAIN

## 2021-04-04 ASSESSMENT — PAIN DESCRIPTION - DESCRIPTORS: DESCRIPTORS: ACHING;CONSTANT;DISCOMFORT

## 2021-04-04 NOTE — PROGRESS NOTES
Chief Complaint:  Chief Complaint   Patient presents with   [facility] Leg Swelling     fell at home,swelling and deformity,shortening to left upper leg     Closed left hip fracture, initial encounter ([address]     Subjective:    She is doing well POD#1 L ORIF    Pain well controlled    No chest pain. Objective:    BP (!) 115/57   Pulse 80   Temp 96.8 °F (36 °C) (Temporal)   Resp 18   Ht 5' (1.524 m)   Wt 135 lb (61.2 kg)   SpO2 98%   BMI 26.37 kg/m²     Current medications that patient is taking have been reviewed.     General appearance: NAD, conversant  HEENT: AT/NC, MMM  Neck: FROM, supple  Lungs: Clear to auscultation, WOB normal  CV: RRR, no MRGs  Abdomen: Soft, non-tender; no masses or HSM, +BS  Extremities: No peripheral edema or digital cyanosis  Skin: no rash, lesions or ulcers  Psych: Calm and cooperative  Neuro: Alert and interactive, face symmetric, moving all extremities, speech fluent    Labs:  CBC with Differential:    Lab Results   Component Value Date    WBC 13.3 04/04/2021    RBC 3.36 04/04/2021    HGB 10.4 04/04/2021    HCT 32.4 04/04/2021     04/04/2021    MCV 96.4 04/04/2021    MCH 31.0 04/04/2021    MCHC 32.1 04/04/2021    RDW 13.0 04/04/2021    LYMPHOPCT 8.7 04/04/2021    MONOPCT 13.0 04/04/2021    BASOPCT 0.2 04/04/2021    MONOSABS 1.73 04/04/2021    LYMPHSABS 1.20 04/04/2021    EOSABS 0.00 04/04/2021    BASOSABS 0.00 04/04/2021     CMP:    Lab Results   Component Value Date     04/04/2021    K 3.9 04/04/2021    K 4.3 04/03/2021     04/04/2021    CO2 26 04/04/2021    BUN 12 04/04/2021    CREATININE 0.8 04/04/2021    GFRAA >60 04/04/2021    LABGLOM >60 04/04/2021    GLUCOSE 110 04/04/2021    PROT 6.9 04/02/2021    LABALBU 4.0 04/02/2021    CALCIUM 8.9 04/04/2021    BILITOT 0.4 04/02/2021    ALKPHOS 51 04/02/2021    AST 16 04/02/2021    ALT 11 04/02/2021            Assessment/Plan:  Principal Problem:    Closed left hip fracture, initial encounter ([facility]  Active

## 2021-04-04 NOTE — PROGRESS NOTES
Physical Therapy Initial Assessment     Name: aArti Harris  : 1935  MRN: [id number]    Referring Provider:  Xander Sauer DO    Date of Service: 2021    Evaluating PT:  Ledy Camarillo DPT  [id number]    Room #:  1882/1216-T  Diagnosis:  L femur fracture   Procedure/Surgery:  HIP, CEPHALOMEDULLARY NAILING on 4/3/21  Precautions:  WBAT LLE   Equipment Needs: To be assessed at rehab     SUBJECTIVE:    Pt lives with  Tyrone Rubio in a 1 story home with 2 stairs to enter and 0 rail. Bed is on 1st floor and bath is on 1st floor. Pt ambulated with no AD PTA. She reports that she owns a cane and w/w. OBJECTIVE:   Initial Evaluation  Date: 2021 Treatment Short Term/ Long Term   Goals   AM-PAC 6 Clicks      Was pt agreeable to Eval/treatment? YES, pt very pleasant and motivated to work with PT      Does pt have pain? Pt reports no pain at rest and minimal pain with movement of LLE      Bed Mobility  Rolling: Min/ Mod A   Supine to sit: Mod A   Sit to supine: Max A   Scooting:  Mod A   Rolling: SBA  Supine to sit: SBA  Sit to supine: SBA  Scooting: SBA   Transfers Sit to stand: min A   Stand to sit: min A   Stand pivot: NT   Sit to stand: SBA  Stand to sit: SBA  Stand pivot: SBA   Ambulation    NA  >150 feet with w/w SBA   Stair negotiation: ascended and descended  NA  2 steps with no rails and min A     ROM BUE:  WFL   RLE:  WFL   LLE:  Ankle WFL   Hip/ knee grossly 50% AROM     Strength BUE:  5/5   LLE:  4/5   R hip:  3-/5   R knee: 3-/5   R ankle 4-/5      Balance Sitting EOB:  SBA to max A (see comments below)  Dynamic Standing:  Min A  Sitting EOB:  Independent   Dynamic Standing:  SBA     Pt is A & O x 3 and very pleasant   Sensation:  Pt denies numbness and tingling to extremities    Therapeutic Exercises:  NA- eval only     Patient education  Pt educated on WBAT status of LLE and role of PT during recovery     Patient response to education:   Pt verbalized understanding Pt

## 2021-04-04 NOTE — PLAN OF CARE
Problem: Pain:  Goal: Pain level will decrease  4/4/2021 1633 by Christel Jones RN  Outcome: Met This Shift  4/4/2021 0844 by Christel Jones RN  Outcome: Met This Shift  Goal: Control of acute pain  4/4/2021 1633 by Christel Jones RN  Outcome: Met This Shift  4/4/2021 0844 by Christel Jones RN  Outcome: Met This Shift  Goal: Control of chronic pain  4/4/2021 1633 by Christel Jones RN  Outcome: Met This Shift  4/4/2021 0844 by Christel Jones RN  Outcome: Met This Shift

## 2021-04-04 NOTE — PROGRESS NOTES
Department of Orthopedic Surgery  Resident Progress Note    POD #1 from left hip ORIF with CMN    Patient seen and examined. Pain controlled. No new complaints. Denies chest pain, shortness of breath, dizziness/lightheadedness. Very motivated this AM.  States she ultimately would like to go home upon discharge. VITALS:  /64   Pulse 89   Temp 98.9 °F (37.2 °C) (Temporal)   Resp 16   Ht 5' (1.524 m)   Wt 135 lb (61.2 kg)   SpO2 96%   BMI 26.37 kg/m²     General: alert and oriented to person, place and time, well-developed and well-nourished, in no acute distress    MUSCULOSKELETAL:   left lower extremity:  · Dressing C/D/I  · Compartments soft and compressible  · +PF/DF/EHL  · +2/4 DP & PT pulses, Brisk Cap refill, Toes warm and perfused  · Distal sensation grossly intact to Peroneals, Sural, Saphenous, and tibial nrs    CBC:   Lab Results   Component Value Date    WBC 13.3 04/04/2021    HGB 10.4 04/04/2021    HCT 32.4 04/04/2021     04/04/2021     PT/INR:    Lab Results   Component Value Date    PROTIME 15.2 04/02/2021    INR 1.4 04/02/2021         ASSESSMENT  · S/P L IT fx CMN - 4/3/21    PLAN      · Continue physical therapy and protocol: WBAT - LLE  · 24 hour abx coverage  · Deep venous thrombosis prophylaxis - Lovenox, transition to ASA upon DC, early mobilization  · Doing well POD #1  · PT/OT  · Pain Control: IV and PO, wean to PO  · Monitor H&H- 10.4  · D/C Plan:  Pending PT/OT, CM and SW recs.

## 2021-04-05 LAB
EKG ATRIAL RATE: 83 BPM
EKG P AXIS: 41 DEGREES
EKG P-R INTERVAL: 134 MS
EKG Q-T INTERVAL: 400 MS
EKG QRS DURATION: 86 MS
EKG QTC CALCULATION (BAZETT): 470 MS
EKG R AXIS: 38 DEGREES
EKG T AXIS: 49 DEGREES
EKG VENTRICULAR RATE: 83 BPM

## 2021-04-05 PROCEDURE — 6370000000 HC RX 637 (ALT 250 FOR IP): Performed by: STUDENT IN AN ORGANIZED HEALTH CARE EDUCATION/TRAINING PROGRAM

## 2021-04-05 PROCEDURE — 97166 OT EVAL MOD COMPLEX 45 MIN: CPT

## 2021-04-05 PROCEDURE — 1200000000 HC SEMI PRIVATE

## 2021-04-05 PROCEDURE — 2580000003 HC RX 258: Performed by: STUDENT IN AN ORGANIZED HEALTH CARE EDUCATION/TRAINING PROGRAM

## 2021-04-05 PROCEDURE — 6360000002 HC RX W HCPCS: Performed by: STUDENT IN AN ORGANIZED HEALTH CARE EDUCATION/TRAINING PROGRAM

## 2021-04-05 PROCEDURE — 97535 SELF CARE MNGMENT TRAINING: CPT

## 2021-04-05 PROCEDURE — 6370000000 HC RX 637 (ALT 250 FOR IP): Performed by: INTERNAL MEDICINE

## 2021-04-05 PROCEDURE — 97530 THERAPEUTIC ACTIVITIES: CPT

## 2021-04-05 RX ORDER — SENNA AND DOCUSATE SODIUM 50; 8.6 MG/1; MG/1
1 TABLET, FILM COATED ORAL 2 TIMES DAILY
Status: DISCONTINUED | OUTPATIENT
Start: 2021-04-05 | End: 2021-04-08 | Stop reason: HOSPADM

## 2021-04-05 RX ORDER — POLYETHYLENE GLYCOL 3350 17 G/17G
17 POWDER, FOR SOLUTION ORAL 2 TIMES DAILY
Status: DISCONTINUED | OUTPATIENT
Start: 2021-04-05 | End: 2021-04-08 | Stop reason: HOSPADM

## 2021-04-05 RX ADMIN — POLYETHYLENE GLYCOL 3350 17 G: 17 POWDER, FOR SOLUTION ORAL at 13:33

## 2021-04-05 RX ADMIN — LEVOTHYROXINE SODIUM 62.5 MCG: 0.12 TABLET ORAL at 06:38

## 2021-04-05 RX ADMIN — SODIUM CHLORIDE, POTASSIUM CHLORIDE, SODIUM LACTATE AND CALCIUM CHLORIDE: 600; 310; 30; 20 INJECTION, SOLUTION INTRAVENOUS at 01:28

## 2021-04-05 RX ADMIN — DOCUSATE SODIUM 50 MG AND SENNOSIDES 8.6 MG 2 TABLET: 8.6; 5 TABLET, FILM COATED ORAL at 08:31

## 2021-04-05 RX ADMIN — ENOXAPARIN SODIUM 40 MG: 40 INJECTION SUBCUTANEOUS at 08:31

## 2021-04-05 RX ADMIN — Medication 10 ML: at 20:29

## 2021-04-05 RX ADMIN — ACETAMINOPHEN 500 MG: 500 TABLET, FILM COATED ORAL at 17:25

## 2021-04-05 RX ADMIN — ACETAMINOPHEN 500 MG: 500 TABLET, FILM COATED ORAL at 09:25

## 2021-04-05 RX ADMIN — POLYETHYLENE GLYCOL 3350 17 G: 17 POWDER, FOR SOLUTION ORAL at 20:28

## 2021-04-05 RX ADMIN — ACETAMINOPHEN 500 MG: 500 TABLET, FILM COATED ORAL at 13:33

## 2021-04-05 RX ADMIN — DOCUSATE SODIUM 50 MG AND SENNOSIDES 8.6 MG 1 TABLET: 8.6; 5 TABLET, FILM COATED ORAL at 20:28

## 2021-04-05 RX ADMIN — CALCIUM CARBONATE-VITAMIN D TAB 500 MG-200 UNIT 2 TABLET: 500-200 TAB at 08:31

## 2021-04-05 RX ADMIN — ACETAMINOPHEN 500 MG: 500 TABLET, FILM COATED ORAL at 21:21

## 2021-04-05 ASSESSMENT — PAIN DESCRIPTION - PAIN TYPE
TYPE: SURGICAL PAIN
TYPE: SURGICAL PAIN

## 2021-04-05 ASSESSMENT — PAIN SCALES - GENERAL
PAINLEVEL_OUTOF10: 5
PAINLEVEL_OUTOF10: 3
PAINLEVEL_OUTOF10: 2
PAINLEVEL_OUTOF10: 4
PAINLEVEL_OUTOF10: 3
PAINLEVEL_OUTOF10: 4

## 2021-04-05 ASSESSMENT — PAIN DESCRIPTION - PROGRESSION
CLINICAL_PROGRESSION: NOT CHANGED
CLINICAL_PROGRESSION: NOT CHANGED

## 2021-04-05 ASSESSMENT — PAIN DESCRIPTION - FREQUENCY: FREQUENCY: INTERMITTENT

## 2021-04-05 ASSESSMENT — PAIN DESCRIPTION - LOCATION
LOCATION: HIP
LOCATION: HIP

## 2021-04-05 ASSESSMENT — PAIN - FUNCTIONAL ASSESSMENT: PAIN_FUNCTIONAL_ASSESSMENT: PREVENTS OR INTERFERES SOME ACTIVE ACTIVITIES AND ADLS

## 2021-04-05 ASSESSMENT — PAIN DESCRIPTION - ONSET
ONSET: ON-GOING
ONSET: GRADUAL
ONSET: GRADUAL

## 2021-04-05 ASSESSMENT — PAIN DESCRIPTION - DESCRIPTORS
DESCRIPTORS: ACHING;DISCOMFORT;DULL
DESCRIPTORS: ACHING;DISCOMFORT;DULL
DESCRIPTORS: ACHING;DISCOMFORT;SORE

## 2021-04-05 ASSESSMENT — PAIN DESCRIPTION - ORIENTATION
ORIENTATION: LEFT
ORIENTATION: LEFT

## 2021-04-05 NOTE — CARE COORDINATION
4/5/2021 social work transition of care planning   Pt is from home with spouse. Pt has w/w.bsc, and s/c. Pt pcp is Dr. Guerita Mendieta and pharmacy is [facility]. Explained sw role in transition of care planning. Pt was provided with andrews list. Sw will follow up in the morning. The Plan for Transition of Care is related to the following treatment goals: improvement of functining    The Patient and/or patient representative  was provided with a choice of provider and agrees   with the discharge plan. [x] Yes [] No    Freedom of choice list was provided with basic dialogue that supports the patient's individualized plan of care/goals, treatment preferences and shares the quality data associated with the providers.  [x] Yes [] No  Electronically signed by BARRINGTON Strickland on 4/5/2021 at 4:13 PM

## 2021-04-05 NOTE — PROGRESS NOTES
Physical Therapy     Name: Noralee Babinski  : 1935  MRN: [id number]    Referring Provider:  Dinorah Nuno DO    Date of Service: 2021    Evaluating PT:  Becca Lopez DPT  [id number]    Room #:  5672/5217-J  Diagnosis:  L femur fracture   Procedure/Surgery:  HIP, CEPHALOMEDULLARY NAILING on 4/3/21  Precautions:  WBAT LLE   Equipment Needs: To be assessed at rehab     SUBJECTIVE:    Pt lives with  Isac Scherer in a 1 story home with 2 stairs to enter and 0 rail. Bed is on 1st floor and bath is on 1st floor. Pt ambulated with no AD PTA. She reports that she owns a cane and w/w. OBJECTIVE:   Initial Evaluation  Date: 2021 Treatment  Short Term/ Long Term   Goals   AM-PAC 6 Clicks 58/67 95/42    Was pt agreeable to Eval/treatment? YES, pt very pleasant and motivated to work with PT  yes    Does pt have pain? Pt reports no pain at rest and minimal pain with movement of LLE  No c/o pain at rest but mild pain with movement    Bed Mobility  Rolling: Min/ Mod A   Supine to sit: Mod A   Sit to supine: Max A   Scooting:  Mod A  Sit to supine MaxA  Scooting ModA Rolling: SBA  Supine to sit: SBA  Sit to supine: SBA  Scooting: SBA   Transfers Sit to stand: min A   Stand to sit: min A   Stand pivot: NT  Sit to stand ModA from chair  Stand to sit Nader  Stand pivot Nader with ww Sit to stand: SBA  Stand to sit: SBA  Stand pivot: SBA   Ambulation    NA 2 feet x1 with ww Nader >150 feet with w/w SBA   Stair negotiation: ascended and descended  NA N/T 2 steps with no rails and min A     ROM BUE:  WFL   RLE:  WFL   LLE:  Ankle WFL   Hip/ knee grossly 50% AROM     Strength BUE:  5/5   LLE:  4/5   R hip:  3-/5   R knee: 3-/5   R ankle 4-/5      Balance Sitting EOB:  SBA to max A (see comments below)  Dynamic Standing:  Min A Sitting EOB SBA   Standing Nader with ww  Sitting EOB:  Independent   Dynamic Standing:  SBA     Pt is A & O x 3 and very pleasant   Sensation:  Pt denies numbness and tingling to extremities    Therapeutic Exercises:  NA- eval only     Patient education  Pt educated on WBAT status of LLE and role of PT during recovery     Patient response to education:   Pt verbalized understanding Pt demonstrated skill Pt requires further education in this area   Yes  Yes  Continue to reinforce      ASSESSMENT:    Comments: Pt cleared by nurse prior to tx session. Pt found sitting in chair and agreed to tx. Pt required [address] [employer] for sit to stand from chair but Nader from bed. Pt amb short distance but able to advance B LEs and demonstrates proper use of ww. Pt assisted to bed. Pt required assistance for B LEs and trunk MaxA. Pt required increased time to perform functional mobility. Pt repositioned and given call light. Treatment:  Patient practiced and was instructed in the following treatment:    Transfers: Pt requird cues for technique and physical assistance  Amb: pt able to advance B LEs. Cues for sequencing  Bed Mobility: Pt required cues and physical assistance. PLAN:    PT care will be provided in accordance with the objectives noted above. Exercises and functional mobility practice will be used as well as appropriate assistive devices or modalities to obtain goals. Patient and family education will also be administered as needed. Frequency of treatments: 2-5x/week x 1-2 weeks.     Time in 1335  Time out  14:00    Total Treatment Time  25 minutes       CPT codes:  [] Low Complexity PT evaluation 36572  [] Moderate Complexity PT evaluation 86799  [] High Complexity PT evaluation 56194  [] PT Re-evaluation 39229  [] Gait training 06265 0 minutes  [] Manual therapy 93773 0 minutes  [x] Therapeutic activities 01595 25 minutes  [] Therapeutic exercises 87553 0 minutes  [] Neuromuscular reeducation 54100 0 minutes     Kim Isaiah [id number]

## 2021-04-05 NOTE — PROGRESS NOTES
Chief Complaint:  Chief Complaint   Patient presents with   [facility] Leg Swelling     fell at home,swelling and deformity,shortening to left upper leg     Closed left hip fracture, initial encounter ([address]     Subjective:    She is doing well POD#2 L ORIF    Pain well controlled w/tylenol only    Objective:    BP (!) 108/57   Pulse 84   Temp 99 °F (37.2 °C) (Temporal)   Resp 17   Ht 5' (1.524 m)   Wt 135 lb (61.2 kg)   SpO2 96%   BMI 26.37 kg/m²     Current medications that patient is taking have been reviewed.     General appearance: NAD, conversant  HEENT: AT/NC, MMM  Neck: FROM, supple  Lungs: Clear to auscultation, WOB normal  CV: RRR, no MRGs  Abdomen: Soft, non-tender; no masses or HSM, +BS  Extremities: No peripheral edema or digital cyanosis  Skin: no rash, lesions or ulcers  Psych: Calm and cooperative  Neuro: Alert and interactive, face symmetric, moving all extremities, speech fluent    Labs:  CBC with Differential:    Lab Results   Component Value Date    WBC 13.3 04/04/2021    RBC 3.36 04/04/2021    HGB 10.4 04/04/2021    HCT 32.4 04/04/2021     04/04/2021    MCV 96.4 04/04/2021    MCH 31.0 04/04/2021    MCHC 32.1 04/04/2021    RDW 13.0 04/04/2021    LYMPHOPCT 8.7 04/04/2021    MONOPCT 13.0 04/04/2021    BASOPCT 0.2 04/04/2021    MONOSABS 1.73 04/04/2021    LYMPHSABS 1.20 04/04/2021    EOSABS 0.00 04/04/2021    BASOSABS 0.00 04/04/2021     CMP:    Lab Results   Component Value Date     04/04/2021    K 3.9 04/04/2021    K 4.3 04/03/2021     04/04/2021    CO2 26 04/04/2021    BUN 12 04/04/2021    CREATININE 0.8 04/04/2021    GFRAA >60 04/04/2021    LABGLOM >60 04/04/2021    GLUCOSE 110 04/04/2021    PROT 6.9 04/02/2021    LABALBU 4.0 04/02/2021    CALCIUM 8.9 04/04/2021    BILITOT 0.4 04/02/2021    ALKPHOS 51 04/02/2021    AST 16 04/02/2021    ALT 11 04/02/2021            Assessment/Plan:  Principal Problem:    Closed left hip fracture, initial encounter [facility])  Active Problems: Hypothyroid    Fall    Hypovitaminosis D  Resolved Problems:    * No resolved hospital problems. *       Continue vit D supplement    Continue analgesics. PT, OT (score only 10/24 yesterday; notes pending today)    Continue synthroid    DVT ppx - lovenox.     Medically ready for d/c     Carson Hobbs    3:57 PM  4/5/2021

## 2021-04-05 NOTE — PROGRESS NOTES
Department of Orthopedic Surgery  Resident Progress Note    POD #1 from left hip ORIF with CMN    Patient seen and examined. Pain controlled. No new complaints. Denies chest pain, shortness of breath, dizziness/lightheadedness. Very motivated this AM.  Stood at the bedside with PT has not walked yet. VITALS:  /71   Pulse 89   Temp 98.3 °F (36.8 °C) (Temporal)   Resp 16   Ht 5' (1.524 m)   Wt 135 lb (61.2 kg)   SpO2 93%   BMI 26.37 kg/m²     General: alert and oriented to person, place and time, well-developed and well-nourished, in no acute distress    MUSCULOSKELETAL:   left lower extremity:  · Dressing C/D/I  · Compartments soft and compressible  · +PF/DF/EHL  · +2/4 DP & PT pulses, Brisk Cap refill, Toes warm and perfused  · Distal sensation grossly intact to Peroneals, Sural, Saphenous, and tibial nrs    CBC:   Lab Results   Component Value Date    WBC 13.3 04/04/2021    HGB 10.4 04/04/2021    HCT 32.4 04/04/2021     04/04/2021     PT/INR:    Lab Results   Component Value Date    PROTIME 15.2 04/02/2021    INR 1.4 04/02/2021         ASSESSMENT  · S/P L IT fx CMN - 4/3/21    PLAN      · Continue physical therapy and protocol: WBAT - LLE  · 24 hour abx coverage  · Deep venous thrombosis prophylaxis - Lovenox, transition to ASA upon DC, early mobilization  · Doing well POD #2  · PT/OT  · Pain Control: IV and PO, wean to PO  · Monitor H&H- 10.4 yesterday, Pending today  · D/C Plan:  Pending PT/OT, CM and SW recs.  [id number] Latoya Andrade for discharge from orthopedic standpoint

## 2021-04-05 NOTE — PROGRESS NOTES
Occupational Therapy  OCCUPATIONAL THERAPY INITIAL EVALUATION      Date:2021  Patient Name: Michelle Burciaga  MRN: [id number]  : 1935  Room: [address] [place] [address]-A    Referring Provider:  Sherri Pritchett DO    Evaluating OT: Jose Abreu OTR/L #[id number]     AM-PAC Daily Activity Raw Score:     Recommended Adaptive Equipment: w/w, AE    Diagnosis: Closed L hip fx    Left Intertrochanteric Femur Fracture   Patient presented to the hospital following fall in kitchen resulting in L hip pain. Surgery: 4/3/21 Left CEPHALOMEDULLARY NAILING     Pertinent Medical History:    No past medical history on file. Precautions:  Falls, WBAT L LE     Home Living: Pt lives with  in a 1 story home with 2 NIRAJ   Bathroom setup: tub/shower combo   Equipment owned: shower chair, cane, w/w, BSC    Prior Level of Function: Independent with ADLs , Independent  with IADLs; ambulated without AD  Driving: yes  Occupation: enjoys being active    Pain Level: Pt denies pain at rest, reports moderate pain with activity;  Therapist facilitated repositioning to address pain      Cognition: A&O: 4/4; Follows 1-2 step directions   Memory:  fair   Sequencing:  Fair-   Problem solving:  Fair-   Judgement/safety:  P+     Functional Assessment:   Initial Eval Status  Date: 21 Treatment Status  Date: Short Term Goals = Long Term Goals  Treatment frequency: 1-4x/wk; PRN   Feeding Independent       Grooming Minimal Assist   Modified [place]    UB Dressing Minimal Assist   Modified [place]    LB Dressing Dependent   Moderate Assist    Bathing Maximal Assist  Moderate Assist    Toileting Dependent   Moderate Assist    Bed Mobility  Supine to sit: Maximal Assist   Sit to supine: NT   Supine to sit: Minimal Assist   Sit to supine: Minimal Assist    Functional Transfers Maximal Assist   (chair)    Moderate Assist  (bed)        Minimal Assist    Functional Mobility Moderate Assist     Several steps with w/w; cuing on w/w management, sequencing, posture and safety   Minimal Assist    Balance Sitting:     Static:  SBA    Dynamic:Min A  Standing: Mod A at w/w        Activity Tolerance F-    Limited due to fatigue and pain  F+   Visual/  Perceptual Glasses: reading  wfl                      Hand dominance: right     Strength ROM Additional Info:    RUE  4/5  WFL good  and wfl FMC/dexterity noted during ADL tasks       LUE 4/5  WFL good  and wfl FMC/dexterity noted during ADL tasks         Hearing: WFL   Sensation:  No c/o numbness or tingling   Tone: WFL   Edema: wfl                            Comments: Upon arrival, patient supine in bed and agreeable to OT Session. Pt demonstrating fair- understanding of education/techniques, requiring additional training / education. At end of session, patient seated in chair with call light and phone within reach, all lines intact. Pt would benefit from continued skilled OT to increase safety,  independence and quality of life. Treatment:  OT services provided: Therapist educated pt on role of OT and WBAT precautions. Therapist facilitated bed mobility, sitting balance at EOB (addressing posture, weight shifting, dynamic reaching), functional transfers (various surfaces: bed, chair), standing tolerance tasks at w/w (addressing posture, activity tolerance and balance) and  Short functional ambulation tasks with w/w (cuing on w/w management, posture, sequencing and safety) - skilled cuing on sequencing, hand placement, body mechanics, posture, LE positioning / placement and safety. Therapist facilitated UB/LB self-care tasks (robe, partial bathing task, socks) and seated grooming tasks - skilled cuing on sequencing, posture, safety and modified techniques due to  L hip surgery. Therapist introduced concept of AE for LB dressing. Skilled monitoring of patients response to treatment session.      Assessment of current deficits    Functional mobility [x]  ADLs [x] Strength [x]  Cognition []  Functional transfers  [x] IADLs [x] Safety Awareness [x]  Endurance [x]  Fine Motor Coordination [] Balance [x] Vision/perception [] Sensation []   Gross Motor Coordination [] ROM [] Delirium []                  Motor Control []      Plan of Care:  1-4 days/wk for 1-2 weeks PRN   Instruction/training on adapted ADL techniques and AE recommendations to increase functional independence within precautions  Training on energy conservation strategies/techniques to improve independence/tolerance for self-care routine  Functional transfer/mobility training/DME recommendations for increased independence, safety, and fall prevention  Patient/Family education to increase follow through with safety techniques and functional independence  Recommendation of environmental modifications for increased safety with functional transfers/mobility and ADLs  Therapeutic exercise to improve motor endurance, ROM, and functional strength for ADLs/functional transfers  Therapeutic activities to facilitate/challenge dynamic balance, stand tolerance, fine motor dexterity/in-hand manipulation for increased independence with ADLs  Neuro-muscular re-education: facilitation of righting/equilibrium reactions, midline orientation, scapular stability/mobility, normalization of muscle tone, and facilitation of volitional active controled movement      Rehab Potential: Good for established goals     Patient / Family Goal: increase strength and independence       Patient and/or family were instructed on functional diagnosis, prognosis/goals and OT plan of care. Demonstrated F- understanding.       AM-PAC Daily Activity Inpatient   How much help for putting on and taking off regular lower body clothing?: Total  How much help for Bathing?: A Lot  How much help for Toileting?: Total  How much help for putting on and taking off regular upper body clothing?: A Little  How much help for taking care of personal grooming?: A Little  How much help for eating meals?: None  AM-PAC Inpatient Daily Activity Raw Score: 14  AM-PAC Inpatient ADL T-Scale Score : 33.39  ADL Inpatient CMS 0-100% Score: 59.67  ADL Inpatient CMS G-Code Modifier : CK         Eval Complexity: mod  Profile and History- med (extensive chart review)  Assessment of Occupational Performance and Identification of Deficits- med  Clinical Decision Making- med     Time In: 1318  Time Out: 1356  Total Treatment Time: 23 minutes    Min Units   OT Eval Low 02904       OT Eval Medium 97166  x 1   OT Eval High 47455       OT Re-Eval [id number]       Therapeutic Ex 17318       Therapeutic Activities 51705  31  1   ADL/Self Care 78107  9  1   Orthotic Management 72722       Neuro Re-Ed 76536       Non-Billable Time          Evaluation Time includes thorough review of current medical information, gathering information on past medical history/social history and prior level of function, completion of standardized testing/informal observation of tasks, assessment of data and education on plan of care and goals.            Barry Tucker OTR/L #[id number]

## 2021-04-06 LAB
ANION GAP SERPL CALCULATED.3IONS-SCNC: 8 MMOL/L (ref 7–16)
BASOPHILS ABSOLUTE: 0.04 E9/L (ref 0–0.2)
BASOPHILS RELATIVE PERCENT: 0.4 % (ref 0–2)
BUN BLDV-MCNC: 10 MG/DL (ref 8–23)
CALCIUM SERPL-MCNC: 8.8 MG/DL (ref 8.6–10.2)
CHLORIDE BLD-SCNC: 104 MMOL/L (ref 98–107)
CO2: 25 MMOL/L (ref 22–29)
CREAT SERPL-MCNC: 0.7 MG/DL (ref 0.5–1)
EOSINOPHILS ABSOLUTE: 0.26 E9/L (ref 0.05–0.5)
EOSINOPHILS RELATIVE PERCENT: 2.8 % (ref 0–6)
GFR AFRICAN AMERICAN: >60
GFR NON-AFRICAN AMERICAN: >60 ML/MIN/1.73
GLUCOSE BLD-MCNC: 127 MG/DL (ref 74–99)
HCT VFR BLD CALC: 28.7 % (ref 34–48)
HEMOGLOBIN: 9.4 G/DL (ref 11.5–15.5)
IMMATURE GRANULOCYTES #: 0.06 E9/L
IMMATURE GRANULOCYTES %: 0.6 % (ref 0–5)
LYMPHOCYTES ABSOLUTE: 1.01 E9/L (ref 1.5–4)
LYMPHOCYTES RELATIVE PERCENT: 10.8 % (ref 20–42)
MCH RBC QN AUTO: 31.3 PG (ref 26–35)
MCHC RBC AUTO-ENTMCNC: 32.8 % (ref 32–34.5)
MCV RBC AUTO: 95.7 FL (ref 80–99.9)
MONOCYTES ABSOLUTE: 0.86 E9/L (ref 0.1–0.95)
MONOCYTES RELATIVE PERCENT: 9.2 % (ref 2–12)
NEUTROPHILS ABSOLUTE: 7.1 E9/L (ref 1.8–7.3)
NEUTROPHILS RELATIVE PERCENT: 76.2 % (ref 43–80)
PDW BLD-RTO: 13 FL (ref 11.5–15)
PLATELET # BLD: 169 E9/L (ref 130–450)
PMV BLD AUTO: 10.1 FL (ref 7–12)
POTASSIUM SERPL-SCNC: 3.9 MMOL/L (ref 3.5–5)
RBC # BLD: 3 E12/L (ref 3.5–5.5)
SARS-COV-2, NAAT: NOT DETECTED
SODIUM BLD-SCNC: 137 MMOL/L (ref 132–146)
WBC # BLD: 9.3 E9/L (ref 4.5–11.5)

## 2021-04-06 PROCEDURE — 97535 SELF CARE MNGMENT TRAINING: CPT

## 2021-04-06 PROCEDURE — 6370000000 HC RX 637 (ALT 250 FOR IP): Performed by: STUDENT IN AN ORGANIZED HEALTH CARE EDUCATION/TRAINING PROGRAM

## 2021-04-06 PROCEDURE — 97530 THERAPEUTIC ACTIVITIES: CPT

## 2021-04-06 PROCEDURE — 6370000000 HC RX 637 (ALT 250 FOR IP): Performed by: INTERNAL MEDICINE

## 2021-04-06 PROCEDURE — 80048 BASIC METABOLIC PNL TOTAL CA: CPT

## 2021-04-06 PROCEDURE — 6360000002 HC RX W HCPCS: Performed by: STUDENT IN AN ORGANIZED HEALTH CARE EDUCATION/TRAINING PROGRAM

## 2021-04-06 PROCEDURE — 2580000003 HC RX 258: Performed by: STUDENT IN AN ORGANIZED HEALTH CARE EDUCATION/TRAINING PROGRAM

## 2021-04-06 PROCEDURE — 1200000000 HC SEMI PRIVATE

## 2021-04-06 PROCEDURE — 85025 COMPLETE CBC W/AUTO DIFF WBC: CPT

## 2021-04-06 PROCEDURE — 87635 SARS-COV-2 COVID-19 AMP PRB: CPT

## 2021-04-06 PROCEDURE — 36415 COLL VENOUS BLD VENIPUNCTURE: CPT

## 2021-04-06 RX ORDER — SODIUM CHLORIDE, SODIUM LACTATE, POTASSIUM CHLORIDE, CALCIUM CHLORIDE 600; 310; 30; 20 MG/100ML; MG/100ML; MG/100ML; MG/100ML
INJECTION, SOLUTION INTRAVENOUS CONTINUOUS
Status: DISCONTINUED | OUTPATIENT
Start: 2021-04-06 | End: 2021-04-07

## 2021-04-06 RX ADMIN — DOCUSATE SODIUM 50 MG AND SENNOSIDES 8.6 MG 1 TABLET: 8.6; 5 TABLET, FILM COATED ORAL at 08:52

## 2021-04-06 RX ADMIN — LEVOTHYROXINE SODIUM 62.5 MCG: 0.12 TABLET ORAL at 06:05

## 2021-04-06 RX ADMIN — ACETAMINOPHEN 500 MG: 500 TABLET, FILM COATED ORAL at 01:32

## 2021-04-06 RX ADMIN — ACETAMINOPHEN 500 MG: 500 TABLET, FILM COATED ORAL at 21:34

## 2021-04-06 RX ADMIN — Medication 10 ML: at 08:53

## 2021-04-06 RX ADMIN — ACETAMINOPHEN 500 MG: 500 TABLET, FILM COATED ORAL at 13:46

## 2021-04-06 RX ADMIN — Medication 10 ML: at 21:34

## 2021-04-06 RX ADMIN — CALCIUM CARBONATE-VITAMIN D TAB 500 MG-200 UNIT 2 TABLET: 500-200 TAB at 08:52

## 2021-04-06 RX ADMIN — ENOXAPARIN SODIUM 40 MG: 40 INJECTION SUBCUTANEOUS at 08:52

## 2021-04-06 RX ADMIN — ACETAMINOPHEN 500 MG: 500 TABLET, FILM COATED ORAL at 09:01

## 2021-04-06 ASSESSMENT — PAIN DESCRIPTION - FREQUENCY: FREQUENCY: INTERMITTENT

## 2021-04-06 ASSESSMENT — PAIN DESCRIPTION - ORIENTATION: ORIENTATION: LEFT

## 2021-04-06 ASSESSMENT — PAIN DESCRIPTION - ONSET: ONSET: GRADUAL

## 2021-04-06 ASSESSMENT — PAIN - FUNCTIONAL ASSESSMENT: PAIN_FUNCTIONAL_ASSESSMENT: PREVENTS OR INTERFERES SOME ACTIVE ACTIVITIES AND ADLS

## 2021-04-06 ASSESSMENT — PAIN SCALES - GENERAL
PAINLEVEL_OUTOF10: 2
PAINLEVEL_OUTOF10: 0
PAINLEVEL_OUTOF10: 0

## 2021-04-06 ASSESSMENT — PAIN DESCRIPTION - PAIN TYPE
TYPE: SURGICAL PAIN
TYPE: SURGICAL PAIN

## 2021-04-06 ASSESSMENT — PAIN DESCRIPTION - LOCATION
LOCATION: HIP
LOCATION: HIP

## 2021-04-06 NOTE — PROGRESS NOTES
Chief Complaint:  Chief Complaint   Patient presents with   [facility] Leg Swelling     fell at home,swelling and deformity,shortening to left upper leg     Closed left hip fracture, initial encounter ([address]     Subjective:    She is doing well POD#3 L ORIF    Pain well controlled w/tylenol only    Reportedly her blood pressure fell into the 80s and she felt lightheaded with occupational therapy today    Objective:    BP (!) 109/58   Pulse 100   Temp 98.9 °F (37.2 °C) (Temporal)   Resp 16   Ht 5' (1.524 m)   Wt 135 lb (61.2 kg)   SpO2 97%   BMI 26.37 kg/m²     Current medications that patient is taking have been reviewed.     General appearance: NAD, conversant  HEENT: AT/NC, MMM  Neck: FROM, supple  Lungs: Clear to auscultation, WOB normal  CV: RRR, no MRGs  Abdomen: Soft, non-tender; no masses or HSM, +BS  Extremities: No peripheral edema or digital cyanosis  Skin: no rash, lesions or ulcers  Psych: Calm and cooperative  Neuro: Alert and interactive, face symmetric, moving all extremities, speech fluent    Labs:  CBC with Differential:    Lab Results   Component Value Date    WBC 9.3 04/06/2021    RBC 3.00 04/06/2021    HGB 9.4 04/06/2021    HCT 28.7 04/06/2021     04/06/2021    MCV 95.7 04/06/2021    MCH 31.3 04/06/2021    MCHC 32.8 04/06/2021    RDW 13.0 04/06/2021    LYMPHOPCT 10.8 04/06/2021    MONOPCT 9.2 04/06/2021    BASOPCT 0.4 04/06/2021    MONOSABS 0.86 04/06/2021    LYMPHSABS 1.01 04/06/2021    EOSABS 0.26 04/06/2021    BASOSABS 0.04 04/06/2021     CMP:    Lab Results   Component Value Date     04/06/2021    K 3.9 04/06/2021    K 4.3 04/03/2021     04/06/2021    CO2 25 04/06/2021    BUN 10 04/06/2021    CREATININE 0.7 04/06/2021    GFRAA >60 04/06/2021    LABGLOM >60 04/06/2021    GLUCOSE 127 04/06/2021    PROT 6.9 04/02/2021    LABALBU 4.0 04/02/2021    CALCIUM 8.8 04/06/2021    BILITOT 0.4 04/02/2021    ALKPHOS 51 04/02/2021    AST 16 04/02/2021    ALT 11 04/02/2021 Assessment/Plan:  Principal Problem:    Closed left hip fracture, initial encounter [facility])  Active Problems:    Hypothyroid    Fall    Hypovitaminosis D  Resolved Problems:    * No resolved hospital problems. *       Recheck CBC, BMP. If those are okay, will give fluids. If there is significant blood loss anemia, will give blood. Continue vit D supplement    Continue analgesics. Continue synthroid    DVT ppx - lovenox.     Requires continued inpatient level of care    Kash Padilla    6:21 PM  4/6/2021

## 2021-04-06 NOTE — PROGRESS NOTES
Occupational Therapy  OT BEDSIDE TREATMENT NOTE      Date:2021  Patient Name: Aarti Harris  MRN: [id number]  : 1935  Room: [address] [place] [address]-A     Referring Provider:  Xander Sauer DO     Evaluating OT: Carlota Aguilar OTR/L #[id number]      AM-PAC Daily Activity Raw Score: 16     Recommended Adaptive Equipment: w/w     Diagnosis: Closed L hip fx               Left Intertrochanteric Femur Fracture   Patient presented to the hospital following fall in kitchen resulting in L hip pain.     Surgery: 4/3/21 Left CEPHALOMEDULLARY NAILING     Pertinent Medical History:    Past Medical History   No past medical history on file.         Precautions:  Falls, WBAT L LE, orthostatic hypotension     Home Living: Pt lives with  in a 1 story home with 2 NIRAJ   Bathroom setup: tub/shower combo   Equipment owned: shower chair, cane, w/w, BSC     Prior Level of Function: Independent with ADLs , Independent  with IADLs; ambulated without AD  Driving: yes  Occupation: enjoys being active     Pain Level: Pt denies pain at rest, reports moderate pain with LLE movement      Cognition: A&O: 4/4; Follows 1-2 step directions              Memory:  fair+              Sequencing:  Fair              Problem solving:  Fair              Judgement/safety:  fair                Functional Assessment:    Initial Eval Status  Date: 21 Treatment Status  Date: 21 Short Term Goals = Long Term Goals  Treatment frequency: 1-4x/wk; PRN   Feeding Independent   Ind     Grooming Minimal Assist   Set up  To wash face seated upright in bed.  Unable to complete at sink this date due to orthostatic hypotension Modified [place]    UB Dressing Minimal Assist  Set up  Modified [place]    LB Dressing Dependent  Max A  To don/doff pants seated EOB and standing to pull over hips  Moderate Assist    Bathing Maximal Assist Max A  simulated  Moderate Assist    Toileting Dependent  Max A- hygiene  Mod A- clothing management  Moderate Assist    Bed Mobility  Supine to sit: Maximal Assist   Sit to supine: NT  Max A- supine<->sit  Educated pt on technique to increase independence.    Supine to sit: Minimal Assist   Sit to supine: Minimal Assist    Functional Transfers Maximal Assist   (chair)     Moderate Assist  (bed)           Max A- sit<->stand  Min A from EOB. Max A from commode and bedside commode following orthostatic episode    Cuing for hand placement and body mechanics Minimal Assist    Functional Mobility Moderate Assist      Several steps with w/w; cuing on w/w management, sequencing, posture and safety   Min A  Home distance using w/w Minimal Assist    Balance Sitting:     Static:  SBA    Dynamic:Min A  Standing: Mod A at w/w     Sitting:     Static:  SBA    Dynamic:Min A  Standing: Mod A at w/w      Activity Tolerance F-     Limited due to fatigue and pain Fair  Orthostatic BP 86/47 when seated on commode  122/61 once supine in trendelenburg   Nursing notified  F+   Visual/  Perceptual Glasses: reading  wfl                           Comments: Upon arrival pt supine in bed. Pt educated on techniques to increase independence and safety during ADL's, bed mobility, and functional transfers. Discussed home set up with pt, daughter, and , giving suggestions to increase safety at discharge. At end of session pt left seated upright in bed, call light within reach, family present. ** Returned to room with PT to complete second treat. Mod A for supine>sit with HOB elevated. Min A for sit>stand, Mod A stand>sit with cuing for hand placement and body mechanics. Set up to comb hair seated EOB. SBA to don gown around back. · Pt has made limited progress towards set goals.      · Continue with current plan of care    Treatment Time In: 11:15            Treatment Time Out: 11:45             Treatment Charges: Mins Units   Ther Ex  46877     Manual Therapy [address] [facility]     Thera Activities 03024 10 1   ADL/Home Mgt 75215 20 1   Neuro Re-ed 40903     Group Therapy      Orthotic manage/training  [id number]     Non-Billable Time     Total Timed Treatment 30 [address], [place]

## 2021-04-06 NOTE — CARE COORDINATION
4/6/2021 social work transition of care planning  Sw followed up with pt and family at bedside. Sw received three andrews choice:1)[facility]-checking for beds 2)[facility] beds 3) [facility]. Sw spoke with James Cuellar and requested more choices. Sw will follow. Electronically signed by BARRINGTON Tanner on 4/6/2021 at 12:42 PM    Addendum; Sw spoke with James Cuellar and received more andrews choices 1) [place]-no beds 2) [facility]-accepted pt they will initiate auth today. Sw will complete hens and place in soft chart.   Electronically signed by BARRINGTON Tanner on 4/6/2021 at 1:34 PM

## 2021-04-06 NOTE — PROGRESS NOTES
Physical Therapy  Facility/Department: [facility]  Daily Treatment Note  NAME: Lance Jarvis  : 1935  MRN: [id number]    Date of Service: 2021      Referring Provider:  DO April Vázquez PT:  Kenna Patterson DPT  [id number]     Room #:  3415/7238-Z  Diagnosis:  L femur fracture   Procedure/Surgery:  HIP, CEPHALOMEDULLARY NAILING on 4/3/21  Precautions:  WBAT LLE   Equipment Needs:  TBD     SUBJECTIVE:     Pt lives with  Betsy Tanner in a 1 story home with 2 stairs to enter and 0 rail. Bed is on 1st floor and bath is on 1st floor. Pt ambulated with no AD PTA. She reports that she owns a cane and w/w.       OBJECTIVE:    Initial Evaluation  Date: 2021 Treatment 21 Short Term/ Long Term   Goals   AM-PAC 6 Clicks 85/69 48/69     Was pt agreeable to Eval/treatment? YES, pt very pleasant and motivated to work with PT  yes     Does pt have pain? Pt reports no pain at rest and minimal pain with movement of LLE  Mild pain L Hip     Bed Mobility  Rolling: Min/ Mod A   Supine to sit: Mod A   Sit to supine: Max A   Scooting: Mod A  Rolling: NT  Supine to sit: ModA   Sit to supine: NT   Scooting: Nader  Rolling: SBA  Supine to sit: SBA  Sit to supine: SBA  Scooting: SBA   Transfers Sit to stand: min A   Stand to sit: min A   Stand pivot: NT  Sit to stand: Nader  Stand to sit: Nader   Stand pivot: Nader [employer]  Sit to stand: SBA  Stand to sit: SBA  Stand pivot: SBA   Ambulation    NA 30' Nader [employer] >150 feet with w/w SBA   Stair negotiation: ascended and descended  NA N/T 2 steps with no rails and min A     ROM BUE:  WFL   RLE:  WFL   LLE:  Ankle WFL   Hip/ knee grossly 50% AROM       Strength BUE:  5/5   LLE:  4/5   R hip:  3-/5   R knee: 3-/5   R ankle 4-/5        Balance Sitting EOB:  SBA to max A (see comments below)  Dynamic Standing:  Min A Sitting EOB SBA   Standing Nader [employer]  Sitting EOB:  Independent   Dynamic Standing:  SBA      Pt is A & O x 4.   Sensation: WNL  Edema: WNL    Vitals:  HR 90  Spo2 98%  PRE  133/68 seated  96/57 Seated  113/77 Seated ~ 10 min  HR 98  Spo2 98%  POST      Therapeutic Exercises:  glute set 1 x 10  Ankle pumps 3 x 30    Patient education  Pt educated on role of PT    Patient response to education:   Pt verbalized understanding Pt demonstrated skill Pt requires further education in this area   x x x     ASSESSMENT:    Comments:  Pt received in supine agreeable to PT. Pt performing bed mobility with assistance and cues. Pt performing functional transfers with assist and cues. BP monitored closely, as pt hypotensive in previous session with OT. Pt ambulating with step to gait, at decreased speed. Patient would benefit from continued skilled PT to maximize functional mobility independence. Treatment:  Patient practiced and was instructed in the following treatment:     Bed mobility- verbal cues to facilitate independence   Functional transfers-Verbal cues for proper positioning and sequencing to perform transfers safely with maximum independence.  Gait training-Verbal cues for proper positioning and sequencing using assistive device to maximize functional mobility independence. PLAN:      PLAN OF CARE:    Current Treatment Recommendations     [x] Strengthening     [x] ROM   [x] Balance Training   [x] Endurance Training   [x] Transfer Training   [x] Gait Training   [x] Stair Training   [x] Positioning   [x] Safety and Education Training   [x] Patient/Caregiver Education   [x] HEP  [] Other       Patient is making good progress towards established goals. Will continue with current POC.       Time in  1350  Time out  1417    Total Treatment Time  27 minutes     CPT codes:  [] Gait training 75306 0 minutes  [] Manual therapy 96337 0 minutes  [x] Therapeutic activities 17209 27 minutes  [] Therapeutic exercises 65426 0 minutes  [] Neuromuscular reeducation 49292 0 minutes    Colette Jac PT, DPT  [id number]

## 2021-04-06 NOTE — DISCHARGE INSTR - COC
Pain Level: 2  Last Weight:   Wt Readings from Last 1 Encounters:   04/02/21 135 lb (61.2 kg)     Mental Status:  oriented and alert    IV Access:  - None    Nursing Mobility/ADLs:  Walking   Assisted  Transfer  Assisted  Bathing  Assisted  Dressing  Assisted  Toileting  Assisted  Feeding  [address] Delivery   whole    Wound Care Documentation and Therapy:        Elimination:  Continence:   · Bowel: Yes  · Bladder: Yes  Urinary Catheter: None   Colostomy/Ileostomy/Ileal Conduit: No        Date of Last BM: 4/7/21    Intake/Output Summary (Last 24 hours) at 4/6/2021 1328  Last data filed at 4/6/2021 1120  Gross per 24 hour   Intake 720 ml   Output 1600 ml   Net -880 ml     I/O last 3 completed shifts: In: 600 [P.O.:600]  Out: 2600 [Urine:2600]    Safety Concerns: At Risk for Falls    Impairments/Disabilities:      surgery L hip    Nutrition Therapy:  Current Nutrition Therapy:   - Oral Diet:  General    Routes of Feeding: Oral  Liquids: No Restrictions  Daily Fluid Restriction: no  Last Modified Barium Swallow with Video (Video Swallowing Test): not done    Treatments at the Time of Hospital Discharge:   Respiratory Treatments: ***  Oxygen Therapy:  is not on home oxygen therapy.   Ventilator:    - No ventilator support    Rehab Therapies: Physical Therapy and Occupational Therapy  Weight Bearing Status/Restrictions: No weight bearing restirctions  Other Medical Equipment (for information only, NOT a DME order):  walker and bedside commode  Other Treatments: ***    Patient's personal belongings (please select all that are sent with patient):  Dentures {DESC; UPPER/LOWER/BOTH:13941}    RN SIGNATURE:  Electronically signed by Tom Benedict RN on 4/8/21 at 4:19 PM EDT    CASE MANAGEMENT/SOCIAL WORK SECTION    Inpatient Status Date: ***    Readmission Risk Assessment Score:  Readmission Risk              Risk of Unplanned Readmission:        7           Discharging to Facility/ Agency · Name: [facility]  · Address:  · Phone:  · Fax:    Dialysis Facility (if applicable)   · Name:  · Address:  · Dialysis Schedule:  · Phone:  · Fax:    / signature: Electronically signed by BARRINGTON Reilly on 2021 at 1:28 PM      PHYSICIAN SECTION    Prognosis: {Prognosis:7106363549}    Condition at Discharge: [address] Patient Condition:[id number]}    Rehab Potential (if transferring to Rehab): {Prognosis:2529419557}    Recommended Labs or Other Treatments After Discharge: ***    Physician Certification: I certify the above information and transfer of Suzanne Wheeler  is necessary for the continuing treatment of the diagnosis listed and that she requires [place] for less 30 days.      Update Admission H&P: [facility]}    PHYSICIAN SIGNATURE:  {Esignature:[id number]}

## 2021-04-07 PROBLEM — D62 POSTOPERATIVE ANEMIA DUE TO ACUTE BLOOD LOSS: Status: ACTIVE | Noted: 2021-04-07

## 2021-04-07 LAB
ABO/RH: NORMAL
ANION GAP SERPL CALCULATED.3IONS-SCNC: 7 MMOL/L (ref 7–16)
ANTIBODY SCREEN: NORMAL
BASOPHILS ABSOLUTE: 0.03 E9/L (ref 0–0.2)
BASOPHILS RELATIVE PERCENT: 0.3 % (ref 0–2)
BLOOD BANK DISPENSE STATUS: NORMAL
BLOOD BANK PRODUCT CODE: NORMAL
BPU ID: NORMAL
BUN BLDV-MCNC: 12 MG/DL (ref 8–23)
CALCIUM SERPL-MCNC: 8.8 MG/DL (ref 8.6–10.2)
CHLORIDE BLD-SCNC: 104 MMOL/L (ref 98–107)
CO2: 25 MMOL/L (ref 22–29)
CREAT SERPL-MCNC: 0.7 MG/DL (ref 0.5–1)
DESCRIPTION BLOOD BANK: NORMAL
EOSINOPHILS ABSOLUTE: 0.34 E9/L (ref 0.05–0.5)
EOSINOPHILS RELATIVE PERCENT: 3.8 % (ref 0–6)
GFR AFRICAN AMERICAN: >60
GFR NON-AFRICAN AMERICAN: >60 ML/MIN/1.73
GLUCOSE BLD-MCNC: 97 MG/DL (ref 74–99)
HCT VFR BLD CALC: 26.7 % (ref 34–48)
HCT VFR BLD CALC: 30.7 % (ref 34–48)
HEMOGLOBIN: 10.1 G/DL (ref 11.5–15.5)
HEMOGLOBIN: 8.7 G/DL (ref 11.5–15.5)
IMMATURE GRANULOCYTES #: 0.06 E9/L
IMMATURE GRANULOCYTES %: 0.7 % (ref 0–5)
LYMPHOCYTES ABSOLUTE: 1.04 E9/L (ref 1.5–4)
LYMPHOCYTES RELATIVE PERCENT: 11.6 % (ref 20–42)
MCH RBC QN AUTO: 30.7 PG (ref 26–35)
MCHC RBC AUTO-ENTMCNC: 32.6 % (ref 32–34.5)
MCV RBC AUTO: 94.3 FL (ref 80–99.9)
MONOCYTES ABSOLUTE: 0.91 E9/L (ref 0.1–0.95)
MONOCYTES RELATIVE PERCENT: 10.2 % (ref 2–12)
NEUTROPHILS ABSOLUTE: 6.58 E9/L (ref 1.8–7.3)
NEUTROPHILS RELATIVE PERCENT: 73.4 % (ref 43–80)
PDW BLD-RTO: 12.9 FL (ref 11.5–15)
PLATELET # BLD: 169 E9/L (ref 130–450)
PMV BLD AUTO: 10.3 FL (ref 7–12)
POTASSIUM SERPL-SCNC: 4 MMOL/L (ref 3.5–5)
RBC # BLD: 2.83 E12/L (ref 3.5–5.5)
SODIUM BLD-SCNC: 136 MMOL/L (ref 132–146)
WBC # BLD: 9 E9/L (ref 4.5–11.5)

## 2021-04-07 PROCEDURE — 6360000002 HC RX W HCPCS: Performed by: STUDENT IN AN ORGANIZED HEALTH CARE EDUCATION/TRAINING PROGRAM

## 2021-04-07 PROCEDURE — 6370000000 HC RX 637 (ALT 250 FOR IP): Performed by: STUDENT IN AN ORGANIZED HEALTH CARE EDUCATION/TRAINING PROGRAM

## 2021-04-07 PROCEDURE — 86901 BLOOD TYPING SEROLOGIC RH(D): CPT

## 2021-04-07 PROCEDURE — 86923 COMPATIBILITY TEST ELECTRIC: CPT

## 2021-04-07 PROCEDURE — 85025 COMPLETE CBC W/AUTO DIFF WBC: CPT

## 2021-04-07 PROCEDURE — 1200000000 HC SEMI PRIVATE

## 2021-04-07 PROCEDURE — 6370000000 HC RX 637 (ALT 250 FOR IP): Performed by: INTERNAL MEDICINE

## 2021-04-07 PROCEDURE — 97535 SELF CARE MNGMENT TRAINING: CPT

## 2021-04-07 PROCEDURE — 97530 THERAPEUTIC ACTIVITIES: CPT

## 2021-04-07 PROCEDURE — P9016 RBC LEUKOCYTES REDUCED: HCPCS

## 2021-04-07 PROCEDURE — 2580000003 HC RX 258: Performed by: STUDENT IN AN ORGANIZED HEALTH CARE EDUCATION/TRAINING PROGRAM

## 2021-04-07 PROCEDURE — 85018 HEMOGLOBIN: CPT

## 2021-04-07 PROCEDURE — 80048 BASIC METABOLIC PNL TOTAL CA: CPT

## 2021-04-07 PROCEDURE — 36415 COLL VENOUS BLD VENIPUNCTURE: CPT

## 2021-04-07 PROCEDURE — 36430 TRANSFUSION BLD/BLD COMPNT: CPT

## 2021-04-07 PROCEDURE — 86900 BLOOD TYPING SEROLOGIC ABO: CPT

## 2021-04-07 PROCEDURE — 86850 RBC ANTIBODY SCREEN: CPT

## 2021-04-07 PROCEDURE — 85014 HEMATOCRIT: CPT

## 2021-04-07 RX ORDER — OYSTER SHELL CALCIUM WITH VITAMIN D 500; 200 MG/1; [IU]/1
2 TABLET, FILM COATED ORAL DAILY
Qty: 30 TABLET | Refills: 3
Start: 2021-04-08

## 2021-04-07 RX ORDER — POLYETHYLENE GLYCOL 3350 17 G/17G
17 POWDER, FOR SOLUTION ORAL DAILY PRN
Qty: 527 G | Refills: 1
Start: 2021-04-07 | End: 2021-05-07

## 2021-04-07 RX ORDER — SODIUM CHLORIDE 9 MG/ML
INJECTION, SOLUTION INTRAVENOUS PRN
Status: DISCONTINUED | OUTPATIENT
Start: 2021-04-07 | End: 2021-04-08 | Stop reason: HOSPADM

## 2021-04-07 RX ORDER — FERROUS SULFATE 325(65) MG
325 TABLET ORAL
Qty: 90 TABLET | Refills: 1
Start: 2021-04-07

## 2021-04-07 RX ORDER — SENNA AND DOCUSATE SODIUM 50; 8.6 MG/1; MG/1
1 TABLET, FILM COATED ORAL 2 TIMES DAILY PRN
Qty: 10 TABLET | Refills: 0
Start: 2021-04-07

## 2021-04-07 RX ADMIN — LEVOTHYROXINE SODIUM 62.5 MCG: 0.12 TABLET ORAL at 05:59

## 2021-04-07 RX ADMIN — ACETAMINOPHEN 500 MG: 500 TABLET, FILM COATED ORAL at 13:29

## 2021-04-07 RX ADMIN — Medication 10 ML: at 09:33

## 2021-04-07 RX ADMIN — ACETAMINOPHEN 500 MG: 500 TABLET, FILM COATED ORAL at 21:49

## 2021-04-07 RX ADMIN — CALCIUM CARBONATE-VITAMIN D TAB 500 MG-200 UNIT 2 TABLET: 500-200 TAB at 09:30

## 2021-04-07 RX ADMIN — Medication 10 ML: at 21:20

## 2021-04-07 RX ADMIN — ACETAMINOPHEN 500 MG: 500 TABLET, FILM COATED ORAL at 09:30

## 2021-04-07 RX ADMIN — ENOXAPARIN SODIUM 40 MG: 40 INJECTION SUBCUTANEOUS at 09:30

## 2021-04-07 ASSESSMENT — PAIN SCALES - GENERAL
PAINLEVEL_OUTOF10: 3
PAINLEVEL_OUTOF10: 2
PAINLEVEL_OUTOF10: 3
PAINLEVEL_OUTOF10: 4

## 2021-04-07 ASSESSMENT — PAIN DESCRIPTION - DESCRIPTORS
DESCRIPTORS: ACHING;DISCOMFORT;SORE
DESCRIPTORS: ACHING;BURNING;SORE

## 2021-04-07 ASSESSMENT — PAIN DESCRIPTION - PAIN TYPE: TYPE: SURGICAL PAIN

## 2021-04-07 ASSESSMENT — PAIN DESCRIPTION - ORIENTATION
ORIENTATION: LEFT
ORIENTATION: LEFT

## 2021-04-07 ASSESSMENT — PAIN - FUNCTIONAL ASSESSMENT: PAIN_FUNCTIONAL_ASSESSMENT: PREVENTS OR INTERFERES SOME ACTIVE ACTIVITIES AND ADLS

## 2021-04-07 NOTE — PROGRESS NOTES
Per PT ambulated patient 50-60 feet, approximately after 8 mins of ambulating patient became symptomatic, with complaints of dizziness and diaphoretic,extreme fatigue. BP taken by physical therapist 89/52.  Patient taken to bed BP laying recovered at 122/65  Left perfect serve message for attending, awaiting reply

## 2021-04-07 NOTE — PROGRESS NOTES
Occupational Therapy  OT BEDSIDE TREATMENT NOTE      Date:2021  Patient Name: Fay Gregg  MRN: [id number]  : 1935  Room: [address] [place] [address]-A     Referring Provider:  Everett Gamez DO     Evaluating OT: Sofia Plaza OTR/L #[id number]      AM-PAC Daily Activity Raw Score: 16     Recommended Adaptive Equipment: w/w     Diagnosis: Closed L hip fx               Left Intertrochanteric Femur Fracture   Patient presented to the hospital following fall in kitchen resulting in L hip pain.     Surgery: 4/3/21 Left CEPHALOMEDULLARY NAILING     Pertinent Medical History:    Past Medical History   No past medical history on file.         Precautions:  Falls, WBAT L LE, orthostatic hypotension     Home Living: Pt lives with  in a 1 story home with 2 NIRAJ   Bathroom setup: tub/shower combo   Equipment owned: shower chair, cane, w/w, BSC     Prior Level of Function: Independent with ADLs , Independent  with IADLs; ambulated without AD  Driving: yes  Occupation: enjoys being active     Pain Level: Pt denies pain at rest, reports min pain with LLE movement      Cognition: A&O: 4/4; Follows 1-2 step directions              Memory:  fair+              Sequencing:  Fair              Problem solving:  Fair              Judgement/safety:  fair                Functional Assessment:    Initial Eval Status  Date: 21 Treatment Status  Date: 21 Short Term Goals = Long Term Goals  Treatment frequency: 1-4x/wk; PRN   Feeding Independent   Ind     Grooming Minimal Assist   Set up  To wash face seated upright in bed.  Unable to complete at sink this date due to orthostatic hypotension Modified [place]    UB Dressing Minimal Assist  SBA Modified [place]    LB Dressing Dependent  Max A  To don/doff pants seated EOB and standing to pull over hips  Moderate Assist    Bathing Maximal Assist Max A  simulated  Moderate Assist    Toileting Dependent  Max A- hygiene/ clothing management  Moderate Assist    Bed Mobility  Supine to sit: Maximal Assist   Sit to supine: NT  Mod A- supine<->sit  Use of HR-Educated pt on technique to increase independence.    Supine to sit: Minimal Assist   Sit to supine: Minimal Assist    Functional Transfers Maximal Assist   (chair)     Moderate Assist  (bed)           Mod A- sit<->stand  Min A from EOB. Mod A from commode and lower surface chair    Cuing for hand placement and sequencing Minimal Assist    Functional Mobility Moderate Assist      Several steps with w/w; cuing on w/w management, sequencing, posture and safety   Min A- mod A when orthostatic symptoms appeared  Home distance using w/w Minimal Assist    Balance Sitting:     Static:  SBA    Dynamic:Min A  Standing: Mod A at w/w     Sitting:     Static:  SBA    Dynamic:Min A  Standing: Mod A at w/w      Activity Tolerance F-     Limited due to fatigue and pain Fair  Orthostatic BP 89/52after mobility   122/65 once supine   Nursing notified  F+   Visual/  Perceptual Glasses: reading  wfl                           Comments: Upon arrival pt sitting in chair and nursing needing ortho BP checks so assisted back to bed and up again. Pt educated on techniques to increase independence and safety during ADL's, bed mobility, and functional transfers. Reviewed bathroom safety and possbile need for  AE for LE dressing and bathing completion.  expressed understanding and stated he would assist.  Discussed home set up with pt, , giving suggestions to increase safety at discharge. At end of session pt left seated upright in bed, call light within reach, family present. · Pt has made fair progress towards set goals.      · Continue with current plan of care    Treatment Time In: 9:58            Treatment Time Out: 10:32            Treatment Charges: Mins Units   Ther Ex  18981     Manual Therapy 57316     Thera Activities 90129 23 2   ADL/Home Mgt 26305 11 1   Neuro Re-ed 58656     Group Therapy      Orthotic manage/training  24054     Non-Billable Time     Total Timed Treatment 34 3       [address]

## 2021-04-07 NOTE — DISCHARGE SUMMARY
Physician Discharge Summary     Patient ID:  Pavel Callaway  [id number]  89 y.o.  1935    Admit date: 4/2/2021    Discharge date and time:  4/8/2021     Admission Diagnoses:   Chief Complaint   Patient presents with   [facility] Leg Swelling     fell at home,swelling and deformity,shortening to left upper leg      Closed left hip fracture, initial encounter [facility])     Discharge Diagnoses:   Principal Problem:    Closed left hip fracture, initial encounter [facility])  Active Problems:    Hypothyroid    Fall    Hypovitaminosis D    Postoperative anemia due to acute blood loss  Resolved Problems:    * No resolved hospital problems. *       Consults: orthopedic surgery    Procedures: L hip ORIF    Hospital Course:   Patient presented with mechanical fall resulting in left hip fracture. She underwent ORIF. Her postoperative course has been complicated by mild blood loss anemia which has been symptomatic with some orthostatic hypotension and lightheadedness. I gave her 1 U PRBC's with good improvement in hemoglobin and resolution of orthostatic hypotension. She will be started on iron supplement as well as vitamin D supplements for mild vitamin D deficiency.      Discharge Exam:  Vitals:    04/06/21 0750 04/06/21 1501 04/06/21 2315 04/07/21 0800   BP: 99/67 (!) 109/58 (!) 120/58 119/68   Pulse: 84 100 83 88   Resp: 16 16 16 17   Temp: 98.9 °F (37.2 °C) 98.9 °F (37.2 °C) 98.6 °F (37 °C) 99 °F (37.2 °C)   TempSrc: Temporal Temporal Temporal Temporal   SpO2: 97% 97% 96% 96%   Weight:       Height:            General: Very pleasant female in no acute distress  Cardiac: Regular rate and rhythm without murmurs  Lungs: Clear bilaterally  Abdomen: Present bowel sounds soft nontender nondistended without rebound or guarding  Extremities: No lower extremity edema     Condition:  Stable    Disposition: SNF    Patient Instructions:   Current Discharge Medication List      START taking these medications    Details   diclofenac sodium (VOLTAREN) 1 % GEL Apply 2 g topically 2 times daily  Qty: 100 g, Refills: 0      sennosides-docusate sodium (SENOKOT-S) 8.6-50 MG tablet Take 1 tablet by mouth 2 times daily as needed for Constipation  Qty: 10 tablet, Refills: 0      polyethylene glycol (GLYCOLAX) 17 g packet Take 17 g by mouth daily as needed for Constipation  Qty: 527 g, Refills: 1      calcium-vitamin D (OSCAL-500) 500-200 MG-UNIT per tablet Take 2 tablets by mouth daily  Qty: 30 tablet, Refills: 3      ferrous sulfate (IRON 325) 325 (65 Fe) MG tablet Take 1 tablet by mouth daily (with breakfast)  Qty: 90 tablet, Refills: 1      aspirin EC 81 MG EC tablet Take 1 tablet by mouth 2 times daily for 28 days  Qty: 56 tablet, Refills: 0      HYDROcodone-acetaminophen (NORCO) 5-325 MG per tablet Take 1 tablet by mouth every 6 hours as needed for Pain for up to 7 days. Intended supply: 7 days. Take lowest dose possible to manage pain  Qty: 28 tablet, Refills: 0    Comments: Reduce doses taken as pain becomes manageable  Associated Diagnoses: Closed left hip fracture, initial encounter ([email or web address] [facility]         CONTINUE these medications which have NOT CHANGED    Details   levothyroxine (SYNTHROID) 125 MCG tablet Take 62.5 mcg by mouth Daily              Activity: activity as tolerated  Diet: regular diet    Follow-up with PCP in 1 week.     Note that over 30 minutes was spent in preparing discharge papers, discussing discharge with patient, medication review, etc.    Signed:  Geo Daly    4/8/2021  2:34 PM

## 2021-04-07 NOTE — CARE COORDINATION
4/7/2021 social work transition of care planning  Pt plan is to [address] is still pending. Lori will follow. Electronically signed by BARRINGTON Bhatt on 4/7/2021 at 10:36 AM     Addendum: Lori notified that [address] has been obtained. Facility can  at 7 pm,if medically stable. Pt to receive blood today. Lori will follow, please call sw if pt not able to discharge today. [phone]. Lori left message for pt's dtr,Josseline and will update Nursing.   Electronically signed by BARRINGTON Bhatt on 4/7/2021 at 12:39 PM

## 2021-04-07 NOTE — PROGRESS NOTES
Physical Therapy  Facility/Department: [facility]  Daily Treatment Note  NAME: Lance Jarvis  : 1935  MRN: [id number]    Date of Service: 2021     Referring Provider: DO April Handy PT: RONIT HernándezT  [id number]     Room #:  7947/4275-J  Diagnosis:  L femur fracture   Procedure/Surgery: Christopher Rubinstein 4/3/21  Precautions:  WBAT LLE, Orthostatic Hypotension  Equipment Needs:  TBD     SUBJECTIVE:     Pt lives with  Betsy Tanner in a 1 story home with 2 stairs to enter and 0 rail.  Bed is on 1st floor and bath is on 1st floor.  Pt ambulated with no AD PTA.  She reports that she owns a cane and w/w.       OBJECTIVE:    Initial Evaluation  Date: 2021 Treatment 21 Short Term/ Long Term   Goals   AM-PAC 6 Clicks 77/69 13/60     Was pt agreeable to Eval/treatment? YES, pt very pleasant and motivated to work with PT  yes     Does pt have pain?  Pt reports no pain at rest and minimal pain with movement of LLE  Mild pain L Hip     Bed Mobility  Rolling: Min/ Mod A   Supine to sit: Mod A   Sit to supine: Max A   Scooting: Mod A  Rolling: NT  Supine to sit: ModA   Sit to supine: NT   Scooting: Nader  Rolling: SBA  Supine to sit: SBA  Sit to supine: SBA  Scooting: SBA   Transfers Sit to stand: min A   Stand to sit: min A   Stand pivot: NT  Sit to stand: Nader  Stand to sit: Nader   Stand pivot: Nader [employer]  Became ModA when Hypotensive  Sit to stand: SBA  Stand to sit: SBA  Stand pivot: SBA   Ambulation    NA 40', 40' Nader [employer] >150 feet with w/w SBA   Stair negotiation: ascended and descended  NA N/T 2 steps with no rails and min A     ROM BUE:  WFL   RLE:  WFL   LLE:  Ankle WFL   Hip/ knee grossly 50% AROM       Strength BUE:  5/5   LLE:  4/5   R hip:  3-/5   R knee: 3-/5   R ankle 4-/5        Balance Sitting EOB:  SBA to max A (see comments below)  Dynamic Standing:  Min A Sitting EOB SBA   Standing Nader WW  Sitting EOB:  Independent   Dynamic Standing:  SBA Training   [x] Gait Training   [x] Stair Training   [x] Positioning   [x] Safety and Education Training   [x] Patient/Caregiver Education   [x] HEP  [] Other       Patient is making good progress towards established goals. Will continue with current POC.       Time in  1000  Time out  1040    Total Treatment Time  40 minutes     CPT codes:  [] Gait training 89115 0 minutes  [] Manual therapy 01805 0 minutes  [x] Therapeutic activities 81905 40 minutes  [] Therapeutic exercises 91457 0 minutes  [] Neuromuscular reeducation 27728 0 minutes    Lillian Oh PT, DPT  [id number]

## 2021-04-07 NOTE — PROGRESS NOTES
Chief Complaint:  Chief Complaint   Patient presents with   Jahaira Sepulveda Leg Swelling     fell at home,swelling and deformity,shortening to left upper leg     Closed left hip fracture, initial encounter ([address]     Subjective:    She is doing well POD#4 L ORIF    Still feeling dizzy/lightheaded with positive orthostatic vitals despite IV fluids    Objective:    BP (!) 117/57   Pulse 81   Temp 98.1 °F (36.7 °C) (Temporal)   Resp 16   Ht 5' (1.524 m)   Wt 135 lb (61.2 kg)   SpO2 97%   BMI 26.37 kg/m²     Current medications that patient is taking have been reviewed. General appearance: NAD, conversant  HEENT: AT/NC, MMM  Neck: FROM, supple  Lungs: Clear to auscultation, WOB normal  CV: RRR, no MRGs  Abdomen: Soft, non-tender; no masses or HSM, +BS  Extremities: No peripheral edema or digital cyanosis  Skin: no rash, lesions or ulcers.   Mild L thigh bruising  Psych: Calm and cooperative  Neuro: Alert and interactive, face symmetric, moving all extremities, speech fluent    Labs:  CBC with Differential:    Lab Results   Component Value Date    WBC 9.0 04/07/2021    RBC 2.83 04/07/2021    HGB 8.7 04/07/2021    HCT 26.7 04/07/2021     04/07/2021    MCV 94.3 04/07/2021    MCH 30.7 04/07/2021    MCHC 32.6 04/07/2021    RDW 12.9 04/07/2021    LYMPHOPCT 11.6 04/07/2021    MONOPCT 10.2 04/07/2021    BASOPCT 0.3 04/07/2021    MONOSABS 0.91 04/07/2021    LYMPHSABS 1.04 04/07/2021    EOSABS 0.34 04/07/2021    BASOSABS 0.03 04/07/2021     CMP:    Lab Results   Component Value Date     04/07/2021    K 4.0 04/07/2021    K 4.3 04/03/2021     04/07/2021    CO2 25 04/07/2021    BUN 12 04/07/2021    CREATININE 0.7 04/07/2021    GFRAA >60 04/07/2021    LABGLOM >60 04/07/2021    GLUCOSE 97 04/07/2021    PROT 6.9 04/02/2021    LABALBU 4.0 04/02/2021    CALCIUM 8.8 04/07/2021    BILITOT 0.4 04/02/2021    ALKPHOS 51 04/02/2021    AST 16 04/02/2021    ALT 11 04/02/2021            Assessment/Plan:  Principal Problem: Closed left hip fracture, initial encounter [facility])  Active Problems:    Hypothyroid    Fall    Hypovitaminosis D    Postoperative anemia due to acute blood loss  Resolved Problems:    * No resolved hospital problems. *       Hemoglobin down to 8.7, from baseline around 11-12. Transfuse 1 U PRBC for symptomatic anemia    Recheck orthostatics and ambulate again after 1 U PRBC    Continue vit D supplement    Continue analgesics. Continue synthroid    DVT ppx - lovenox.     Requires continued inpatient level of care    Autumn Butler    7:07 PM  4/7/2021

## 2021-04-08 VITALS
DIASTOLIC BLOOD PRESSURE: 67 MMHG | RESPIRATION RATE: 16 BRPM | WEIGHT: 135 LBS | TEMPERATURE: 98 F | SYSTOLIC BLOOD PRESSURE: 141 MMHG | HEART RATE: 75 BPM | OXYGEN SATURATION: 95 % | HEIGHT: 60 IN | BODY MASS INDEX: 26.5 KG/M2

## 2021-04-08 LAB
ANION GAP SERPL CALCULATED.3IONS-SCNC: 13 MMOL/L (ref 7–16)
BASOPHILS ABSOLUTE: 0.06 E9/L (ref 0–0.2)
BASOPHILS RELATIVE PERCENT: 0.7 % (ref 0–2)
BUN BLDV-MCNC: 12 MG/DL (ref 8–23)
CALCIUM SERPL-MCNC: 8.8 MG/DL (ref 8.6–10.2)
CHLORIDE BLD-SCNC: 109 MMOL/L (ref 98–107)
CO2: 21 MMOL/L (ref 22–29)
CREAT SERPL-MCNC: 0.7 MG/DL (ref 0.5–1)
EOSINOPHILS ABSOLUTE: 0.44 E9/L (ref 0.05–0.5)
EOSINOPHILS RELATIVE PERCENT: 5.1 % (ref 0–6)
GFR AFRICAN AMERICAN: >60
GFR NON-AFRICAN AMERICAN: >60 ML/MIN/1.73
GLUCOSE BLD-MCNC: 92 MG/DL (ref 74–99)
HCT VFR BLD CALC: 34.2 % (ref 34–48)
HEMOGLOBIN: 11.2 G/DL (ref 11.5–15.5)
IMMATURE GRANULOCYTES #: 0.11 E9/L
IMMATURE GRANULOCYTES %: 1.3 % (ref 0–5)
LYMPHOCYTES ABSOLUTE: 1.36 E9/L (ref 1.5–4)
LYMPHOCYTES RELATIVE PERCENT: 15.7 % (ref 20–42)
MCH RBC QN AUTO: 30.8 PG (ref 26–35)
MCHC RBC AUTO-ENTMCNC: 32.7 % (ref 32–34.5)
MCV RBC AUTO: 94 FL (ref 80–99.9)
MONOCYTES ABSOLUTE: 0.91 E9/L (ref 0.1–0.95)
MONOCYTES RELATIVE PERCENT: 10.5 % (ref 2–12)
NEUTROPHILS ABSOLUTE: 5.81 E9/L (ref 1.8–7.3)
NEUTROPHILS RELATIVE PERCENT: 66.7 % (ref 43–80)
PDW BLD-RTO: 14.2 FL (ref 11.5–15)
PLATELET # BLD: 229 E9/L (ref 130–450)
PMV BLD AUTO: 10.3 FL (ref 7–12)
POTASSIUM SERPL-SCNC: 4 MMOL/L (ref 3.5–5)
RBC # BLD: 3.64 E12/L (ref 3.5–5.5)
SODIUM BLD-SCNC: 143 MMOL/L (ref 132–146)
WBC # BLD: 8.7 E9/L (ref 4.5–11.5)

## 2021-04-08 PROCEDURE — 6370000000 HC RX 637 (ALT 250 FOR IP): Performed by: STUDENT IN AN ORGANIZED HEALTH CARE EDUCATION/TRAINING PROGRAM

## 2021-04-08 PROCEDURE — 97530 THERAPEUTIC ACTIVITIES: CPT

## 2021-04-08 PROCEDURE — 36415 COLL VENOUS BLD VENIPUNCTURE: CPT

## 2021-04-08 PROCEDURE — 97535 SELF CARE MNGMENT TRAINING: CPT

## 2021-04-08 PROCEDURE — 80048 BASIC METABOLIC PNL TOTAL CA: CPT

## 2021-04-08 PROCEDURE — 85025 COMPLETE CBC W/AUTO DIFF WBC: CPT

## 2021-04-08 PROCEDURE — 2580000003 HC RX 258: Performed by: STUDENT IN AN ORGANIZED HEALTH CARE EDUCATION/TRAINING PROGRAM

## 2021-04-08 PROCEDURE — 6360000002 HC RX W HCPCS: Performed by: STUDENT IN AN ORGANIZED HEALTH CARE EDUCATION/TRAINING PROGRAM

## 2021-04-08 PROCEDURE — 6370000000 HC RX 637 (ALT 250 FOR IP): Performed by: INTERNAL MEDICINE

## 2021-04-08 RX ADMIN — LEVOTHYROXINE SODIUM 62.5 MCG: 0.12 TABLET ORAL at 10:29

## 2021-04-08 RX ADMIN — Medication 10 ML: at 08:07

## 2021-04-08 RX ADMIN — ENOXAPARIN SODIUM 40 MG: 40 INJECTION SUBCUTANEOUS at 08:08

## 2021-04-08 RX ADMIN — CALCIUM CARBONATE-VITAMIN D TAB 500 MG-200 UNIT 2 TABLET: 500-200 TAB at 08:07

## 2021-04-08 RX ADMIN — ACETAMINOPHEN 500 MG: 500 TABLET, FILM COATED ORAL at 12:33

## 2021-04-08 RX ADMIN — ACETAMINOPHEN 500 MG: 500 TABLET, FILM COATED ORAL at 16:52

## 2021-04-08 RX ADMIN — ACETAMINOPHEN 500 MG: 500 TABLET, FILM COATED ORAL at 08:07

## 2021-04-08 ASSESSMENT — PAIN DESCRIPTION - ORIENTATION: ORIENTATION: LEFT

## 2021-04-08 ASSESSMENT — PAIN SCALES - GENERAL: PAINLEVEL_OUTOF10: 3

## 2021-04-08 ASSESSMENT — PAIN DESCRIPTION - LOCATION: LOCATION: HIP

## 2021-04-08 ASSESSMENT — PAIN DESCRIPTION - PAIN TYPE: TYPE: SURGICAL PAIN

## 2021-04-08 NOTE — PROGRESS NOTES
Physical Therapy  Facility/Department: [facility]  Daily Treatment Note  NAME: Darlyn Fitch  : 1935  MRN: [id number]    Date of Service: 2021     Referring Provider: DO April Cota PT: Harvey Severs, DPT  [id number]     Room #:  7871/6532-P  Diagnosis:  L femur fracture   Procedure/Surgery: Deena Hunger 4/3/21  Precautions:  WBAT LLE,   Equipment Needs:  TBD     SUBJECTIVE:     Pt lives with  Shara Cowart in a 1 story home with 2 stairs to enter and 0 rail.  Bed is on 1st floor and bath is on 1st floor.  Pt ambulated with no AD PTA.  She reports that she owns a cane and w/w.       OBJECTIVE:    Initial Evaluation  Date: 2021 Treatment 21 Short Term/ Long Term   Goals   AM-PAC 6 Clicks 96/34 70/09     Was pt agreeable to Eval/treatment? YES, pt very pleasant and motivated to work with PT  yes     Does pt have pain?  Pt reports no pain at rest and minimal pain with movement of LLE  Mild pain L Hip     Bed Mobility  Rolling: Min/ Mod A   Supine to sit: Mod A   Sit to supine: Max A   Scooting: Mod A  Rolling: NT  Supine to sit: NT  Sit to supine: NT   Scooting: Nader  Rolling: SBA  Supine to sit: SBA  Sit to supine: SBA  Scooting: SBA   Transfers Sit to stand: min A   Stand to sit: min A   Stand pivot: NT  Sit to stand: Nader  Stand to sit: Nader   Stand pivot: Nader [employer]   Sit to stand: SBA  Stand to sit: SBA  Stand pivot: SBA   Ambulation    NA 45', 20', 20' Nader [employer] >150 feet with w/w SBA   Stair negotiation: ascended and descended  NA 4 stairs BHR ModA 2 steps with no rails and min A     ROM BUE:  WFL   RLE:  WFL   LLE:  Ankle WFL   Hip/ knee grossly 50% AROM       Strength BUE:  5/5   LLE:  4/5   R hip:  3-/5   R knee: 3-/5   R ankle 4-/5        Balance Sitting EOB:  SBA to max A (see comments below)  Dynamic Standing:  Min A Sitting EOB SBA   Standing Nader WW  Sitting EOB:  Independent   Dynamic Standing:  SBA      Pt is A & O x 4  Sensation: WNL  Edema:  WNL     Vitals:  HR 98 spo2 98 PRE  BP seated 118/58  126/58 immediately post ambulation standing  /55 seated post  HR 98 spo2 98 POST    Patient education  Pt educated on role of PT    Patient response to education:   Pt verbalized understanding Pt demonstrated skill Pt requires further education in this area   x x x     ASSESSMENT:    Comments:  Pt received in bedside chair agreeable to PT. Pt performing functional transfers with assistance and verbal cues for hand placement. Pt ambulating with step to, antalgic gait pattern. Continues to require verbal cues for sequencing with [employer]. Pt requiring assistance and cues for stair negotiation due to pain and weakness with LLE. Patient would benefit from continued skilled PT to maximize functional mobility independence. Treatment:  Patient practiced and was instructed in the following treatment:     Bed mobility- verbal cues to facilitate independence   Functional transfers-Verbal cues for proper positioning and sequencing to perform transfers safely with maximum independence.  Gait training-Verbal cues for proper positioning and sequencing using assistive device to maximize functional mobility independence.  Stair negotiation- verbal cues to facilitate independence    PLAN:      PLAN OF CARE:    Current Treatment Recommendations     [x] Strengthening     [x] ROM   [x] Balance Training   [x] Endurance Training   [x] Transfer Training   [x] Gait Training   [x] Stair Training   [x] Positioning   [x] Safety and Education Training   [x] Patient/Caregiver Education   [x] HEP  [] Other       Patient is making good progress towards established goals. Will continue with current POC.       Time in  0830  Time out  0855    Total Treatment Time  25 minutes     CPT codes:  [] Gait training 04561 0 minutes  [] Manual therapy 20127 0 minutes  [x] Therapeutic activities 55122 25 minutes  [] Therapeutic exercises 98317 0 minutes  [] Neuromuscular reeducation 88908 0 minutes    [facility] PT, DPT  [id number]

## 2021-04-08 NOTE — PROGRESS NOTES
Nurse to nurse report called to Tyra at [facility], all questions answered and paperwork faxed.

## 2021-04-08 NOTE — PROGRESS NOTES
Functional Transfers Maximal Assist   (chair)     Moderate Assist  (bed)          Min A with sit <> stand, SPT using ww. Minimal Assist    Functional Mobility Moderate Assist      Several steps with w/w; cuing on w/w management, sequencing, posture and safety  Min A with ww ~Household distances with min. Cues for proper sequences Minimal Assist    Balance Sitting:     Static:  SBA    Dynamic:Min A  Standing: Mod A at w/w     Sitting:     Static:  Sup    Dynamic:SBA  Standing: Min A at w/w      Activity Tolerance F-     Limited due to fatigue and pain Fair/+  Pre-BP 1. 118/58  Post-BP 2. 117/55  spO2 & HR WFL F+   Visual/  Perceptual Glasses: reading  wfl        wfl           Comments: Upon arrival pt seated in chair.  present & instructed throughout. Therapist facilitated ADLs/ functional transfer/mobility training, A.E. training (issued hip kit), with pt. Demonstrating Fair+ understanding. Pt. Instructed RE: safe transfers/mobility, ADLs, role of OT, treatment plan, recs. , prec. At end of session seated in chair, all needs met, RN notified, all lines and tubes intact, call light within reach. · Pt has made good progress towards set goals. · Continue with current plan of care    Treatment Time In: 8:50            Treatment Time Out: 9:20             Treatment Charges: Mins Units   Ther Ex  44627     Manual Therapy [id number]     Thera Activities 85650 15 1   ADL/Home Mgt 96768 15 1   Neuro Re-ed 66027     Group Therapy      Orthotic manage/training  21661     Non-Billable Time     Total Timed Treatment 30 2     Mignon Lanza, OTR/L   License #  [id number]

## 2021-04-08 NOTE — CARE COORDINATION
4/8/2021 social work transition of care planning  Pt plan Is to [facility] medically stable. Sw checking for discharge. Electronically signed by BARRINGTON Obrien on 4/8/2021 at 12:48 PM     Addendum; Facility will be arranging transportation for later this afternoon 5PM. Sw will notify dtr and Nurse. Sw will follow.   Electronically signed by BARRINGTON Obrien on 4/8/2021 at 1:07 PM

## 2021-04-13 ENCOUNTER — TELEPHONE (OUTPATIENT)
Dept: ADMINISTRATIVE | Age: 86
End: 2021-04-13

## 2021-04-13 NOTE — TELEPHONE ENCOUNTER
Patient had surgery with Dr Jay Arnett on 4/3. Was told to follow-up in 7-10 days to remove janet. Daughter, Toñito Pond, says she has been unable to reach office to schedule.  Please reach back to Toñito Pond at [phone]

## 2021-04-16 ENCOUNTER — OFFICE VISIT (OUTPATIENT)
Dept: ORTHOPEDIC SURGERY | Age: 86
End: 2021-04-16

## 2021-04-16 ENCOUNTER — TELEPHONE (OUTPATIENT)
Dept: ORTHOPEDIC SURGERY | Age: 86
End: 2021-04-16

## 2021-04-16 VITALS — HEIGHT: 60 IN | BODY MASS INDEX: 24.35 KG/M2 | WEIGHT: 124 LBS

## 2021-04-16 DIAGNOSIS — S72.002A CLOSED LEFT HIP FRACTURE, INITIAL ENCOUNTER (HCC): Primary | ICD-10-CM

## 2021-04-16 PROCEDURE — 99024 POSTOP FOLLOW-UP VISIT: CPT | Performed by: ORTHOPAEDIC SURGERY

## 2021-04-16 RX ORDER — ACETAMINOPHEN 325 MG/1
650 TABLET ORAL EVERY 6 HOURS PRN
COMMUNITY
End: 2021-05-28

## 2021-04-16 RX ORDER — TRAMADOL HYDROCHLORIDE 50 MG/1
50 TABLET ORAL EVERY 8 HOURS PRN
Qty: 28 TABLET | Refills: 0 | Status: SHIPPED | OUTPATIENT
Start: 2021-04-16 | End: 2021-04-23

## 2021-04-16 RX ORDER — HYDROCODONE BITARTRATE AND ACETAMINOPHEN 5; 325 MG/1; MG/1
1 TABLET ORAL EVERY 6 HOURS PRN
COMMUNITY
End: 2021-05-28

## 2021-04-16 SDOH — HEALTH STABILITY: MENTAL HEALTH: HOW MANY STANDARD DRINKS CONTAINING ALCOHOL DO YOU HAVE ON A TYPICAL DAY?: NOT ASKED

## 2021-04-16 NOTE — PROGRESS NOTES
Follow Up Post Operative Visit     Surgery: LEFT HIP, CEPHALOMEDULLARY NAILING  Date: 4/3/2021    Subjective:    Prashant Amaro is here for follow up visit s/p above procedure. She is doing well. She has been at a facility but is now going home. She reports pain. She has been up putting weight on the leg with PT    Controlled Substances Monitoring:        Physical Exam:    Height: 5' (1.524 m), Weight: 124 lb (56.2 kg)    General: Alert and oriented x3, no acute distress  Cardiovascular/pulmonary: No labored breathing, peripheral perfusion intact  Musculoskeletal:    Exam shows intact incisions. There is some resolving ecchymosis over the anterior thigh. Compartments are soft. Range of motion of the hip is tolerable    Imaging: X-rays left hip including 3 view show good alignment of left hip hardware status post ORIF    Impression: Good alignment of left hip status post ORIF    Assessment and Plan: She is about 2 weeks out from left hip ORIF doing well  She will continue to progress with PT. Weightbearing as tolerated.   DVT prophylaxis 4 weeks total.  Follow-up in 6 weeks with images    Tim Duran MD  Orthopaedic Surgery   4/16/21  10:19 AM

## 2021-04-16 NOTE — PROGRESS NOTES
incision site was cleaned and staples were removed from (L) hip. Minimal swelling, no bleeding. Pt tolerated. Wound care instructions given.

## 2021-04-22 ENCOUNTER — TELEPHONE (OUTPATIENT)
Dept: ORTHOPEDIC SURGERY | Age: 86
End: 2021-04-22

## 2021-04-22 DIAGNOSIS — Z98.890 STATUS POST HIP SURGERY: Primary | ICD-10-CM

## 2021-04-22 DIAGNOSIS — S72.002A CLOSED LEFT HIP FRACTURE, INITIAL ENCOUNTER (HCC): ICD-10-CM

## 2021-04-22 NOTE — TELEPHONE ENCOUNTER
Patient called requesting a hospital bed d/t her hip fx surgery.      LEFT HIP, CEPHALOMEDULLARY NAILING 4/3/2021     Pended and routed for signature

## 2021-05-27 NOTE — PROGRESS NOTES
Follow Up Post Operative Visit     Surgery: LEFT HIP, CEPHALOMEDULLARY NAILING  Date: 4/3/2021    Subjective:    Tim Egan is here for follow up visit s/p above procedure. She is doing well. She has been compliant with postoperative plan of care. She continues with physical therapy as directed. Controlled Substances Monitoring:        Physical Exam:    Height: 5' (1.524 m), Weight: 124 lb (56.2 kg)    General: Alert and oriented x3, no acute distress  Cardiovascular/pulmonary: No labored breathing, peripheral perfusion intact  Musculoskeletal:    Left hip exam incision site has become a mature scar there is no signs or symptoms of infection present. Neurovascular sensation grossly intact. No pain with internal or external rotation. Abduction and adduction are intact. Gait is steady. Imaging: X-ray including 3 views of the left hip and pelvis show status post IM nail of the left hip with evidence of bone remodeling present. Impression: Healing fracture status post left hip IM nail with orthopedic hardware in correct alignment. Assessment and Plan: Status post left hip cephalomedullary nailing    Today we discussed her left hip. Patient is almost 2 months out from procedure listed above. She is doing very well. She continues to work with physical therapy as directed. Patient states that home health PT is to run out soon and she was provided with an outpatient physical therapy prescription today. Patient will continue to ambulate with walker or cane for added stability. She is reassured that she may apply full weight to the operative extremity at this point. Patient and family member verbalized understanding. She will follow-up in 6 weeks.       Mabel Eli CNP  Orthopedic Surgery   05/28/21  1:23 PM

## 2021-05-28 ENCOUNTER — OFFICE VISIT (OUTPATIENT)
Dept: ORTHOPEDIC SURGERY | Age: 86
End: 2021-05-28

## 2021-05-28 VITALS — BODY MASS INDEX: 24.35 KG/M2 | WEIGHT: 124 LBS | HEIGHT: 60 IN

## 2021-05-28 DIAGNOSIS — Z98.890 STATUS POST HIP SURGERY: Primary | ICD-10-CM

## 2021-05-28 DIAGNOSIS — S72.002A CLOSED LEFT HIP FRACTURE, INITIAL ENCOUNTER (HCC): ICD-10-CM

## 2021-05-28 PROCEDURE — 99024 POSTOP FOLLOW-UP VISIT: CPT | Performed by: NURSE PRACTITIONER

## 2021-06-17 ENCOUNTER — TELEPHONE (OUTPATIENT)
Dept: ORTHOPEDIC SURGERY | Age: 86
End: 2021-06-17

## 2021-07-07 ENCOUNTER — OFFICE VISIT (OUTPATIENT)
Dept: ORTHOPEDIC SURGERY | Age: 86
End: 2021-07-07
Payer: MEDICARE

## 2021-07-07 VITALS — HEIGHT: 60 IN | WEIGHT: 124 LBS | RESPIRATION RATE: 14 BRPM | BODY MASS INDEX: 24.35 KG/M2

## 2021-07-07 DIAGNOSIS — Z98.890 STATUS POST HIP SURGERY: ICD-10-CM

## 2021-07-07 DIAGNOSIS — S72.002D CLOSED FRACTURE OF LEFT HIP WITH ROUTINE HEALING, SUBSEQUENT ENCOUNTER: Primary | ICD-10-CM

## 2021-07-07 DIAGNOSIS — S72.002A CLOSED LEFT HIP FRACTURE, INITIAL ENCOUNTER (HCC): ICD-10-CM

## 2021-07-07 PROCEDURE — 99213 OFFICE O/P EST LOW 20 MIN: CPT | Performed by: ORTHOPAEDIC SURGERY

## 2021-10-06 ENCOUNTER — OFFICE VISIT (OUTPATIENT)
Dept: ORTHOPEDIC SURGERY | Age: 86
End: 2021-10-06
Payer: MEDICARE

## 2021-10-06 VITALS — BODY MASS INDEX: 23.56 KG/M2 | WEIGHT: 120 LBS | HEIGHT: 60 IN

## 2021-10-06 DIAGNOSIS — S72.002A CLOSED LEFT HIP FRACTURE, INITIAL ENCOUNTER (HCC): Primary | ICD-10-CM

## 2021-10-06 PROCEDURE — 99213 OFFICE O/P EST LOW 20 MIN: CPT | Performed by: ORTHOPAEDIC SURGERY

## 2021-10-06 NOTE — PROGRESS NOTES
Follow Up Post Operative Visit     Surgery: LEFT HIP CEPHALOMEDULLARY NAILING  Date: 4/3/2021    Subjective:    Shahla Todd is here for follow up visit s/p above procedure. She is doing well.   She has been ***    Controlled Substances Monitoring:        Physical Exam:    Height: 5' (1.524 m), Weight: 120 lb (54.4 kg)    General: Alert and oriented x3, no acute distress  Cardiovascular/pulmonary: No labored breathing, peripheral perfusion intact  Musculoskeletal:    ***      Imaging: ***    Assessment and Plan:  ***  ***    Katlin Garcia MD  Orthopaedic Surgery   10/6/21  10:59 AM

## 2021-10-06 NOTE — PROGRESS NOTES
Follow Up Post Operative Visit     Surgery: Left hip CMN  Date: 4/3/2021    Subjective:    Paty Santillan is here for follow up visit s/p above procedure. She doing well. Still ambulating with a walker, no pain in the hip with ambulation or range of motion. Some pain laterally when she lays on the hip    Controlled Substances Monitoring:        Physical Exam:    Height: 5' (1.524 m), Weight: 120 lb (54.4 kg)    General: Alert and oriented x3, no acute distress  Cardiovascular/pulmonary: No labored breathing, peripheral perfusion intact  Musculoskeletal:    Exam of the hip shows good range of motion. Leg lengths near equivalent. Mild tenderness laterally over the greater trochanter. Imaging: X-rays reviewed left hip 3 views showing good alignment with healing of her intertrochanteric fracture, mild settling of the screw which is unchanged from previous images    Impression: Healed intertrochanteric fracture with stable hardware    Assessment and Plan: 6 months out from left hip cephalomedullary nail for intertrochanteric fracture  Overall she is doing well. She will continue to progress with strengthening and activities as tolerated.   We will see her back at the 1 year marianela for final x-ray of the left hip    Stephen Pizano MD  Orthopaedic Surgery   10/6/21  11:19 AM

## 2021-10-18 ENCOUNTER — OFFICE VISIT (OUTPATIENT)
Dept: ORTHOPEDIC SURGERY | Age: 86
End: 2021-10-18
Payer: MEDICARE

## 2021-10-18 VITALS — BODY MASS INDEX: 24.35 KG/M2 | HEIGHT: 60 IN | WEIGHT: 124 LBS

## 2021-10-18 DIAGNOSIS — Z98.890 STATUS POST HIP SURGERY: Primary | ICD-10-CM

## 2021-10-18 PROCEDURE — 99213 OFFICE O/P EST LOW 20 MIN: CPT | Performed by: ORTHOPAEDIC SURGERY

## 2021-10-18 RX ORDER — LEVOTHYROXINE SODIUM 25 MCG
12.5 TABLET ORAL
COMMUNITY
Start: 2021-08-23

## 2021-10-18 RX ORDER — LEVOTHYROXINE SODIUM 50 MCG
TABLET ORAL
COMMUNITY
Start: 2021-08-10

## 2021-10-18 NOTE — PROGRESS NOTES
Follow Up Post Operative Visit     Surgery: LEFT HIP, CEPHALOMEDULLARY NAILING  Date: 4/3/2021    Subjective:    Trina Limon is here for follow up visit s/p above procedure. She is doing well. She has been getting around without pain. She does note some crepitus over the lateral side of the hip which is not painful    Controlled Substances Monitoring:        Physical Exam:    Height: 5' (1.524 m), Weight: 124 lb (56.2 kg)    General: Alert and oriented x3, no acute distress  Cardiovascular/pulmonary: No labored breathing, peripheral perfusion intact  Musculoskeletal:    Exam of the hip today shows no pain in the groin. She does have some crepitus over the greater trochanter and likely over the lag screw which is nonpainful. Imaging: X-rays 3 views left hip reviewed showing healed fracture, there has been some settling of the lag screw which is unchanged from previous images    Impression: Healed intertrochanteric fracture with intact hardware    Assessment and Plan: Left hip intertrochanteric fracture status post cephalomedullary nail now about 6 months out  We discussed her hip today. She was provided reassurance she is feeling some crepitus over the lateral aspect of the hip we discussed this could be from her IT band rubbing over the screw. She is not having any pain in this area. Recommended continued observation. We will see her back as needed at this point.     Ginny Riley MD  Orthopaedic Surgery   10/18/21  2:11 PM

## 2022-02-15 ENCOUNTER — TELEPHONE (OUTPATIENT)
Dept: ORTHOPEDIC SURGERY | Age: 87
End: 2022-02-15

## 2022-02-15 DIAGNOSIS — S72.002A CLOSED LEFT HIP FRACTURE, INITIAL ENCOUNTER (HCC): Primary | ICD-10-CM

## 2022-02-15 DIAGNOSIS — Z98.890 STATUS POST HIP SURGERY: ICD-10-CM

## 2022-02-15 NOTE — TELEPHONE ENCOUNTER
Surgery: LEFT HIP, [address] NAILING  Date: 4/3/2021    Pt called requesting a new script for a walker, states the one she uses is very old. Requesting to have put at front office.      Script provided, pt notified

## 2022-03-10 ENCOUNTER — TELEPHONE (OUTPATIENT)
Dept: ORTHOPEDIC SURGERY | Age: 87
End: 2022-03-10

## 2022-03-10 NOTE — TELEPHONE ENCOUNTER
Pt is 11 months p/o from L Hip Cephalomedullary Nailing 4/3/2021    Pt is inquiring to know if riding a bicycle is OK. She was not sure if she was allowed to move her leg in a circular motion. ..  Expressed to pt that there is always a risk with riding a bicycle d/t falling, but that she is cleared to do all activity from ortho standpoint    Wants advisement from provider

## 2022-04-06 ENCOUNTER — OFFICE VISIT (OUTPATIENT)
Dept: ORTHOPEDIC SURGERY | Age: 87
End: 2022-04-06
Payer: MEDICARE

## 2022-04-06 DIAGNOSIS — Z98.890 STATUS POST HIP SURGERY: Primary | ICD-10-CM

## 2022-04-06 PROCEDURE — 99213 OFFICE O/P EST LOW 20 MIN: CPT | Performed by: ORTHOPAEDIC SURGERY

## 2022-04-06 NOTE — PROGRESS NOTES
Follow Up Post Operative Visit     Surgery: LEFT HIP, CEPHALOMEDULLARY NAILING  Date: 4/3/2021    Subjective:    Louisa Mejia is here for follow up visit s/p above procedure. She is doing well. She has returned to all activities without restrictions tolerating well. Denies pain during today's visit. Controlled Substances Monitoring:        Physical Exam:    No data recorded    General: Alert and oriented x3, no acute distress  Cardiovascular/pulmonary: No labored breathing, peripheral perfusion intact  Musculoskeletal:    Left hip exam gait is steady, no swelling deformity visualized. Nontender to palpation. Passive range of motion internal and external rotation mildly stiff without pain. Full abduction/adduction without pain. No weakness present on exam.      Imaging: X-ray including 3 views of the left hip and pelvis shows healed fracture with some settling of the leg screw that is unchanged from previous imaging    Impression healed trochanteric fracture with intact hardware    Assessment and Plan: Left hip intertrochanteric fracture status post several medullary nailing x1 year out    Today we discussed her left hip. Patient has returned to all activities without restrictions tolerating well. Denies pain during physical exam today. X-rays obtained today showed maintained alignment of orthopedic hardware. She will continue with activities as tolerated. She will follow-up as needed      TING Miller  Orthopedic Surgery   04/06/22  11:43 AM    Staff Addendum    I have seen and evaluated the patient and agree with the assessment and plan as documented by Rian Carrion CNP. I have performed the key components of the history and physical examination and concur with the findings and plan, and have made changes where appropriate/necessary.           Tiarra Vasquez MD  [address]

## 2022-08-03 ENCOUNTER — TELEPHONE (OUTPATIENT)
Dept: ORTHOPEDIC SURGERY | Age: 87
End: 2022-08-03

## 2022-08-03 DIAGNOSIS — Z98.890 STATUS POST HIP SURGERY: Primary | ICD-10-CM

## 2023-08-29 ENCOUNTER — OFFICE VISIT (OUTPATIENT)
Dept: ORTHOPEDIC SURGERY | Age: 88
End: 2023-08-29
Payer: MEDICARE

## 2023-08-29 VITALS — BODY MASS INDEX: 25.13 KG/M2 | HEIGHT: 60 IN | WEIGHT: 128 LBS

## 2023-08-29 DIAGNOSIS — Z98.890 STATUS POST HIP SURGERY: ICD-10-CM

## 2023-08-29 DIAGNOSIS — M79.605 LEFT LEG PAIN: Primary | ICD-10-CM

## 2023-08-29 DIAGNOSIS — S72.002A CLOSED LEFT HIP FRACTURE, INITIAL ENCOUNTER (HCC): ICD-10-CM

## 2023-08-29 PROCEDURE — 1123F ACP DISCUSS/DSCN MKR DOCD: CPT | Performed by: NURSE PRACTITIONER

## 2023-08-29 PROCEDURE — 99213 OFFICE O/P EST LOW 20 MIN: CPT | Performed by: NURSE PRACTITIONER

## 2023-08-29 NOTE — PROGRESS NOTES
knee, hip on exam.  Patient is currently ambulate with assistance of walker. She will continue with these activities as tolerated. Continue with OTC medications as needed for symptom relief.   She will follow-up as needed          CYNTHIA Gibbs-CNP  Orthopaedic Surgery   8/29/23  3:10 PM

## 2024-02-09 NOTE — TELEPHONE ENCOUNTER
Attempted to call patient back per [facility] request- unable to LVM.
Patient called inquiring what she can do for her leg length discrepancy since surgery 4/3/2021. States her left leg is shorter and bothersome. Wearing an orthotic/wedge from drug-store with no improvement.      Please advise
no

## 2024-10-02 ENCOUNTER — APPOINTMENT (OUTPATIENT)
Dept: GENERAL RADIOLOGY | Age: 89
DRG: 536 | End: 2024-10-02
Payer: MEDICARE

## 2024-10-02 ENCOUNTER — HOSPITAL ENCOUNTER (INPATIENT)
Age: 89
LOS: 2 days | Discharge: NURSING, REHAB OR LONG-TERM CARE | DRG: 536 | End: 2024-10-04
Attending: STUDENT IN AN ORGANIZED HEALTH CARE EDUCATION/TRAINING PROGRAM | Admitting: INTERNAL MEDICINE
Payer: MEDICARE

## 2024-10-02 ENCOUNTER — APPOINTMENT (OUTPATIENT)
Dept: CT IMAGING | Age: 89
DRG: 536 | End: 2024-10-02
Payer: MEDICARE
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
## 2024-10-02 DIAGNOSIS — S32.509A CLOSED FRACTURE OF PUBIS, UNSPECIFIED PORTION OF PUBIS, UNSPECIFIED LATERALITY, INITIAL ENCOUNTER (HCC): ICD-10-CM

## 2024-10-02 DIAGNOSIS — S70.00XA CONTUSION OF HIP, UNSPECIFIED LATERALITY, INITIAL ENCOUNTER: ICD-10-CM

## 2024-10-02 DIAGNOSIS — S09.90XA INJURY OF HEAD, INITIAL ENCOUNTER: Primary | ICD-10-CM

## 2024-10-02 PROBLEM — S32.599A PUBIC RAMUS FRACTURE, UNSPECIFIED LATERALITY, CLOSED, INITIAL ENCOUNTER (HCC): Status: ACTIVE | Noted: 2024-10-02

## 2024-10-02 LAB
ANION GAP SERPL CALCULATED.3IONS-SCNC: 9 MMOL/L (ref 7–16)
BASOPHILS # BLD: 0.02 K/UL (ref 0–0.2)
BASOPHILS NFR BLD: 0 % (ref 0–2)
BUN SERPL-MCNC: 19 MG/DL (ref 6–23)
CALCIUM SERPL-MCNC: 9.1 MG/DL (ref 8.6–10.2)
CHLORIDE SERPL-SCNC: 105 MMOL/L (ref 98–107)
CO2 SERPL-SCNC: 24 MMOL/L (ref 22–29)
CREAT SERPL-MCNC: 0.8 MG/DL (ref 0.5–1)
EOSINOPHIL # BLD: 0.02 K/UL (ref 0.05–0.5)
EOSINOPHILS RELATIVE PERCENT: 0 % (ref 0–6)
ERYTHROCYTE [DISTWIDTH] IN BLOOD BY AUTOMATED COUNT: 13.3 % (ref 11.5–15)
GFR, ESTIMATED: 72 ML/MIN/1.73M2
GLUCOSE SERPL-MCNC: 142 MG/DL (ref 74–99)
HCT VFR BLD AUTO: 36.8 % (ref 34–48)
HGB BLD-MCNC: 12.1 G/DL (ref 11.5–15.5)
IMM GRANULOCYTES # BLD AUTO: 0.08 K/UL (ref 0–0.58)
IMM GRANULOCYTES NFR BLD: 1 % (ref 0–5)
LYMPHOCYTES NFR BLD: 0.89 K/UL (ref 1.5–4)
LYMPHOCYTES RELATIVE PERCENT: 7 % (ref 20–42)
MCH RBC QN AUTO: 34.4 PG (ref 26–35)
MCHC RBC AUTO-ENTMCNC: 32.9 G/DL (ref 32–34.5)
MCV RBC AUTO: 104.5 FL (ref 80–99.9)
MONOCYTES NFR BLD: 0.97 K/UL (ref 0.1–0.95)
MONOCYTES NFR BLD: 7 % (ref 2–12)
NEUTROPHILS NFR BLD: 86 % (ref 43–80)
NEUTS SEG NFR BLD: 11.68 K/UL (ref 1.8–7.3)
PLATELET # BLD AUTO: 142 K/UL (ref 130–450)
PMV BLD AUTO: 9.5 FL (ref 7–12)
POTASSIUM SERPL-SCNC: 4.2 MMOL/L (ref 3.5–5)
RBC # BLD AUTO: 3.52 M/UL (ref 3.5–5.5)
SODIUM SERPL-SCNC: 138 MMOL/L (ref 132–146)
WBC OTHER # BLD: 13.7 K/UL (ref 4.5–11.5)

## 2024-10-02 PROCEDURE — 72192 CT PELVIS W/O DYE: CPT

## 2024-10-02 PROCEDURE — 1200000000 HC SEMI PRIVATE

## 2024-10-02 PROCEDURE — 73552 X-RAY EXAM OF FEMUR 2/>: CPT

## 2024-10-02 PROCEDURE — G0378 HOSPITAL OBSERVATION PER HR: HCPCS

## 2024-10-02 PROCEDURE — 99285 EMERGENCY DEPT VISIT HI MDM: CPT

## 2024-10-02 PROCEDURE — 72131 CT LUMBAR SPINE W/O DYE: CPT

## 2024-10-02 PROCEDURE — 70450 CT HEAD/BRAIN W/O DYE: CPT

## 2024-10-02 PROCEDURE — 85025 COMPLETE CBC W/AUTO DIFF WBC: CPT

## 2024-10-02 PROCEDURE — 73502 X-RAY EXAM HIP UNI 2-3 VIEWS: CPT

## 2024-10-02 PROCEDURE — 6370000000 HC RX 637 (ALT 250 FOR IP): Performed by: STUDENT IN AN ORGANIZED HEALTH CARE EDUCATION/TRAINING PROGRAM

## 2024-10-02 PROCEDURE — 72125 CT NECK SPINE W/O DYE: CPT

## 2024-10-02 PROCEDURE — 80048 BASIC METABOLIC PNL TOTAL CA: CPT

## 2024-10-02 PROCEDURE — 6370000000 HC RX 637 (ALT 250 FOR IP)

## 2024-10-02 PROCEDURE — 2580000003 HC RX 258

## 2024-10-02 RX ORDER — ENOXAPARIN SODIUM 100 MG/ML
40 INJECTION SUBCUTANEOUS DAILY
Status: DISCONTINUED | OUTPATIENT
Start: 2024-10-03 | End: 2024-10-04 | Stop reason: HOSPADM

## 2024-10-02 RX ORDER — ONDANSETRON 2 MG/ML
4 INJECTION INTRAMUSCULAR; INTRAVENOUS EVERY 6 HOURS PRN
Status: DISCONTINUED | OUTPATIENT
Start: 2024-10-02 | End: 2024-10-04 | Stop reason: HOSPADM

## 2024-10-02 RX ORDER — CLOBETASOL PROPIONATE 0.5 MG/G
OINTMENT TOPICAL 2 TIMES DAILY
Status: DISCONTINUED | OUTPATIENT
Start: 2024-10-03 | End: 2024-10-04 | Stop reason: HOSPADM

## 2024-10-02 RX ORDER — POTASSIUM CHLORIDE 7.45 MG/ML
10 INJECTION INTRAVENOUS PRN
Status: DISCONTINUED | OUTPATIENT
Start: 2024-10-02 | End: 2024-10-04 | Stop reason: HOSPADM

## 2024-10-02 RX ORDER — POTASSIUM CHLORIDE 1500 MG/1
40 TABLET, EXTENDED RELEASE ORAL PRN
Status: DISCONTINUED | OUTPATIENT
Start: 2024-10-02 | End: 2024-10-04 | Stop reason: HOSPADM

## 2024-10-02 RX ORDER — SENNOSIDES A AND B 8.6 MG/1
1 TABLET, FILM COATED ORAL DAILY PRN
Status: DISCONTINUED | OUTPATIENT
Start: 2024-10-02 | End: 2024-10-04 | Stop reason: HOSPADM

## 2024-10-02 RX ORDER — ACETAMINOPHEN 500 MG
1000 TABLET ORAL ONCE
Status: COMPLETED | OUTPATIENT
Start: 2024-10-02 | End: 2024-10-02

## 2024-10-02 RX ORDER — LEVOTHYROXINE SODIUM 25 UG/1
12.5 TABLET ORAL DAILY
Status: DISCONTINUED | OUTPATIENT
Start: 2024-10-03 | End: 2024-10-02 | Stop reason: SDUPTHER

## 2024-10-02 RX ORDER — ONDANSETRON 4 MG/1
4 TABLET, ORALLY DISINTEGRATING ORAL EVERY 8 HOURS PRN
Status: DISCONTINUED | OUTPATIENT
Start: 2024-10-02 | End: 2024-10-04 | Stop reason: HOSPADM

## 2024-10-02 RX ORDER — SODIUM CHLORIDE 0.9 % (FLUSH) 0.9 %
10 SYRINGE (ML) INJECTION PRN
Status: DISCONTINUED | OUTPATIENT
Start: 2024-10-02 | End: 2024-10-04 | Stop reason: HOSPADM

## 2024-10-02 RX ORDER — SODIUM CHLORIDE 9 MG/ML
INJECTION, SOLUTION INTRAVENOUS PRN
Status: DISCONTINUED | OUTPATIENT
Start: 2024-10-02 | End: 2024-10-04 | Stop reason: HOSPADM

## 2024-10-02 RX ORDER — ACETAMINOPHEN 650 MG/1
650 SUPPOSITORY RECTAL EVERY 6 HOURS PRN
Status: DISCONTINUED | OUTPATIENT
Start: 2024-10-02 | End: 2024-10-04 | Stop reason: HOSPADM

## 2024-10-02 RX ORDER — SODIUM CHLORIDE 0.9 % (FLUSH) 0.9 %
10 SYRINGE (ML) INJECTION EVERY 12 HOURS SCHEDULED
Status: DISCONTINUED | OUTPATIENT
Start: 2024-10-02 | End: 2024-10-04 | Stop reason: HOSPADM

## 2024-10-02 RX ORDER — IBUPROFEN 400 MG/1
400 TABLET, FILM COATED ORAL ONCE
Status: DISCONTINUED | OUTPATIENT
Start: 2024-10-02 | End: 2024-10-04 | Stop reason: HOSPADM

## 2024-10-02 RX ORDER — LEVOTHYROXINE SODIUM 50 UG/1
50 TABLET ORAL DAILY
Status: DISCONTINUED | OUTPATIENT
Start: 2024-10-03 | End: 2024-10-02 | Stop reason: SDUPTHER

## 2024-10-02 RX ORDER — ACETAMINOPHEN 325 MG/1
650 TABLET ORAL EVERY 6 HOURS PRN
Status: DISCONTINUED | OUTPATIENT
Start: 2024-10-02 | End: 2024-10-03

## 2024-10-02 RX ADMIN — ACETAMINOPHEN 1000 MG: 500 TABLET ORAL at 17:21

## 2024-10-02 RX ADMIN — ACETAMINOPHEN 650 MG: 325 TABLET ORAL at 23:57

## 2024-10-02 RX ADMIN — SODIUM CHLORIDE, PRESERVATIVE FREE 10 ML: 5 INJECTION INTRAVENOUS at 23:25

## 2024-10-02 ASSESSMENT — PAIN SCALES - GENERAL
PAINLEVEL_OUTOF10: 2
PAINLEVEL_OUTOF10: 5
PAINLEVEL_OUTOF10: 4

## 2024-10-02 ASSESSMENT — PAIN DESCRIPTION - DESCRIPTORS: DESCRIPTORS: DISCOMFORT

## 2024-10-02 ASSESSMENT — PAIN DESCRIPTION - ORIENTATION: ORIENTATION: LEFT

## 2024-10-02 ASSESSMENT — ENCOUNTER SYMPTOMS
ABDOMINAL DISTENTION: 0
SHORTNESS OF BREATH: 0
COUGH: 0
ABDOMINAL PAIN: 0
CHEST TIGHTNESS: 0
DIARRHEA: 0
PHOTOPHOBIA: 0
NAUSEA: 0
VOMITING: 0

## 2024-10-02 ASSESSMENT — LIFESTYLE VARIABLES
HOW OFTEN DO YOU HAVE A DRINK CONTAINING ALCOHOL: NEVER
HOW MANY STANDARD DRINKS CONTAINING ALCOHOL DO YOU HAVE ON A TYPICAL DAY: PATIENT DOES NOT DRINK

## 2024-10-02 ASSESSMENT — PAIN DESCRIPTION - LOCATION: LOCATION: HIP

## 2024-10-02 NOTE — ED PROVIDER NOTES
Sarah Melendez is an 89 year old female who presented to ED with concern for hip pain following a mechanical fall.  Patient did hit her head when she had fallen denies loss of consciousness.  Patient denies numbness, tingling, weakness, patient denies chest pain or shortness of breath. Patient has a history of previous left hip replacement and followed previously with Dr Hinds. Patient has also followed with Dr Herrera and did request to see Dr herrera if she requires orthopedic surgery.     The history is provided by the patient, the EMS personnel and medical records.        Review of Systems   Constitutional:  Negative for chills, diaphoresis, fatigue and fever.   Eyes:  Negative for photophobia and visual disturbance.   Respiratory:  Negative for cough, chest tightness and shortness of breath.    Cardiovascular:  Negative for chest pain, palpitations and leg swelling.   Gastrointestinal:  Negative for abdominal distention, abdominal pain, diarrhea, nausea and vomiting.   Genitourinary:  Negative for dysuria.   Musculoskeletal:  Negative for neck pain and neck stiffness.   Skin:  Negative for pallor and rash.   Neurological:  Negative for headaches.   Psychiatric/Behavioral:  Negative for confusion.         Physical Exam  Vitals and nursing note reviewed.   Constitutional:       General: She is not in acute distress.  HENT:      Head: Normocephalic and atraumatic.   Eyes:      General: No scleral icterus.     Conjunctiva/sclera: Conjunctivae normal.      Pupils: Pupils are equal, round, and reactive to light.   Cardiovascular:      Rate and Rhythm: Normal rate and regular rhythm.   Pulmonary:      Effort: Pulmonary effort is normal.      Breath sounds: Normal breath sounds.   Abdominal:      General: Bowel sounds are normal. There is no distension.      Palpations: Abdomen is soft.      Tenderness: There is no abdominal tenderness. There is no guarding or rebound.   Musculoskeletal:      Cervical back: Normal     Glucose 142 (H) 74 - 99 mg/dL    BUN 19 6 - 23 mg/dL    Creatinine 0.8 0.50 - 1.00 mg/dL    Est, Glom Filt Rate 72 >60 mL/min/1.73m2    Calcium 9.1 8.6 - 10.2 mg/dL       RADIOLOGY:  CT LUMBAR SPINE WO CONTRAST   Final Result   1. Nondisplaced fractures are seen involving right and left pubic bones.   2. Nondisplaced fracture involving left inferior pubic ramus.   3. Nondisplaced fracture involving left sacral ala.   4. Internal fixating hardware is associated with proximal left femur   5. No evidence of hip fracture or dislocation.   6. No evidence of lumbar spine fracture.         CT PELVIS WO CONTRAST Additional Contrast? None   Final Result   1. Nondisplaced fractures are seen involving right and left pubic bones.   2. Nondisplaced fracture involving left inferior pubic ramus.   3. Nondisplaced fracture involving left sacral ala.   4. Internal fixating hardware is associated with proximal left femur   5. No evidence of hip fracture or dislocation.   6. No evidence of lumbar spine fracture.         CT HEAD WO CONTRAST   Final Result   1. No acute intracranial abnormality.   2. Moderate cerebral atrophy with periventricular white matter changes.   3. Nonspecific white matter changes, likely related to chronic microvascular   ischemic disease.         CT CERVICAL SPINE WO CONTRAST   Final Result   No acute abnormality of the cervical spine.         XR HIP 2-3 VW W PELVIS LEFT   Final Result   1. No acute bony abnormalities.   2. Hardware in the left hip is intact.         XR FEMUR LEFT (MIN 2 VIEWS)   Final Result   No acute bony abnormalities.                 ------------------------- NURSING NOTES AND VITALS REVIEWED ---------------------------  Date / Time Roomed:  10/2/2024  2:39 PM  ED Bed Assignment:  0730/0730-A    The nursing notes within the ED encounter and vital signs as below have been reviewed.     Patient Vitals for the past 24 hrs:   BP Temp Temp src Pulse Resp SpO2 Height Weight   10/02/24 0925

## 2024-10-02 NOTE — DISCHARGE INSTRUCTIONS
CT HEAD WO CONTRAST   Final Result   1. No acute intracranial abnormality.   2. Moderate cerebral atrophy with periventricular white matter changes.   3. Nonspecific white matter changes, likely related to chronic microvascular   ischemic disease.         CT CERVICAL SPINE WO CONTRAST   Final Result   No acute abnormality of the cervical spine.         XR HIP 2-3 VW W PELVIS LEFT   Final Result   1. No acute bony abnormalities.   2. Hardware in the left hip is intact.         XR FEMUR LEFT (MIN 2 VIEWS)   Final Result   No acute bony abnormalities.

## 2024-10-03 LAB
ALBUMIN SERPL-MCNC: 3.8 G/DL (ref 3.5–5.2)
ALP SERPL-CCNC: 61 U/L (ref 35–104)
ALT SERPL-CCNC: 17 U/L (ref 0–32)
ANION GAP SERPL CALCULATED.3IONS-SCNC: 10 MMOL/L (ref 7–16)
AST SERPL-CCNC: 18 U/L (ref 0–31)
BASOPHILS # BLD: 0.02 K/UL (ref 0–0.2)
BASOPHILS NFR BLD: 0 % (ref 0–2)
BILIRUB SERPL-MCNC: 0.6 MG/DL (ref 0–1.2)
BUN SERPL-MCNC: 17 MG/DL (ref 6–23)
CALCIUM SERPL-MCNC: 8.9 MG/DL (ref 8.6–10.2)
CHLORIDE SERPL-SCNC: 106 MMOL/L (ref 98–107)
CO2 SERPL-SCNC: 23 MMOL/L (ref 22–29)
CREAT SERPL-MCNC: 0.8 MG/DL (ref 0.5–1)
EOSINOPHIL # BLD: 0.09 K/UL (ref 0.05–0.5)
EOSINOPHILS RELATIVE PERCENT: 1 % (ref 0–6)
ERYTHROCYTE [DISTWIDTH] IN BLOOD BY AUTOMATED COUNT: 13.3 % (ref 11.5–15)
GFR, ESTIMATED: 77 ML/MIN/1.73M2
GLUCOSE SERPL-MCNC: 111 MG/DL (ref 74–99)
HCT VFR BLD AUTO: 36.9 % (ref 34–48)
HGB BLD-MCNC: 12.2 G/DL (ref 11.5–15.5)
IMM GRANULOCYTES # BLD AUTO: 0.06 K/UL (ref 0–0.58)
IMM GRANULOCYTES NFR BLD: 1 % (ref 0–5)
LYMPHOCYTES NFR BLD: 1.08 K/UL (ref 1.5–4)
LYMPHOCYTES RELATIVE PERCENT: 9 % (ref 20–42)
MCH RBC QN AUTO: 34.2 PG (ref 26–35)
MCHC RBC AUTO-ENTMCNC: 33.1 G/DL (ref 32–34.5)
MCV RBC AUTO: 103.4 FL (ref 80–99.9)
MONOCYTES NFR BLD: 1.06 K/UL (ref 0.1–0.95)
MONOCYTES NFR BLD: 9 % (ref 2–12)
NEUTROPHILS NFR BLD: 80 % (ref 43–80)
NEUTS SEG NFR BLD: 9.49 K/UL (ref 1.8–7.3)
PLATELET # BLD AUTO: 149 K/UL (ref 130–450)
PMV BLD AUTO: 9.9 FL (ref 7–12)
POTASSIUM SERPL-SCNC: 3.9 MMOL/L (ref 3.5–5)
PROT SERPL-MCNC: 6.5 G/DL (ref 6.4–8.3)
RBC # BLD AUTO: 3.57 M/UL (ref 3.5–5.5)
SODIUM SERPL-SCNC: 139 MMOL/L (ref 132–146)
WBC OTHER # BLD: 11.8 K/UL (ref 4.5–11.5)

## 2024-10-03 PROCEDURE — 85025 COMPLETE CBC W/AUTO DIFF WBC: CPT

## 2024-10-03 PROCEDURE — 6370000000 HC RX 637 (ALT 250 FOR IP)

## 2024-10-03 PROCEDURE — G0378 HOSPITAL OBSERVATION PER HR: HCPCS

## 2024-10-03 PROCEDURE — 97530 THERAPEUTIC ACTIVITIES: CPT

## 2024-10-03 PROCEDURE — 96372 THER/PROPH/DIAG INJ SC/IM: CPT

## 2024-10-03 PROCEDURE — 6370000000 HC RX 637 (ALT 250 FOR IP): Performed by: INTERNAL MEDICINE

## 2024-10-03 PROCEDURE — 80053 COMPREHEN METABOLIC PANEL: CPT

## 2024-10-03 PROCEDURE — 6360000002 HC RX W HCPCS

## 2024-10-03 PROCEDURE — 1200000000 HC SEMI PRIVATE

## 2024-10-03 PROCEDURE — 2580000003 HC RX 258

## 2024-10-03 PROCEDURE — 97165 OT EVAL LOW COMPLEX 30 MIN: CPT

## 2024-10-03 PROCEDURE — 97535 SELF CARE MNGMENT TRAINING: CPT

## 2024-10-03 PROCEDURE — 97161 PT EVAL LOW COMPLEX 20 MIN: CPT

## 2024-10-03 RX ORDER — ACETAMINOPHEN 500 MG
500 TABLET ORAL EVERY 4 HOURS PRN
Status: DISCONTINUED | OUTPATIENT
Start: 2024-10-03 | End: 2024-10-04 | Stop reason: HOSPADM

## 2024-10-03 RX ORDER — ACETAMINOPHEN 500 MG
TABLET ORAL
Status: DISPENSED
Start: 2024-10-03 | End: 2024-10-03

## 2024-10-03 RX ORDER — ASPIRIN 81 MG/1
81 TABLET ORAL DAILY
DISCHARGE
Start: 2024-10-03 | End: 2024-11-02

## 2024-10-03 RX ORDER — SENNOSIDES A AND B 8.6 MG/1
1 TABLET, FILM COATED ORAL DAILY PRN
DISCHARGE
Start: 2024-10-03 | End: 2024-11-02

## 2024-10-03 RX ADMIN — CALCIUM CARBONATE-VITAMIN D TAB 500 MG-200 UNIT 1 TABLET: 500-200 TAB at 08:25

## 2024-10-03 RX ADMIN — ACETAMINOPHEN 500 MG: 500 TABLET ORAL at 08:24

## 2024-10-03 RX ADMIN — SODIUM CHLORIDE, PRESERVATIVE FREE 10 ML: 5 INJECTION INTRAVENOUS at 21:23

## 2024-10-03 RX ADMIN — ENOXAPARIN SODIUM 40 MG: 100 INJECTION SUBCUTANEOUS at 08:26

## 2024-10-03 RX ADMIN — LEVOTHYROXINE SODIUM 62.5 MCG: 25 TABLET ORAL at 05:44

## 2024-10-03 RX ADMIN — ACETAMINOPHEN 500 MG: 500 TABLET ORAL at 12:50

## 2024-10-03 RX ADMIN — ACETAMINOPHEN 500 MG: 500 TABLET ORAL at 16:54

## 2024-10-03 RX ADMIN — SODIUM CHLORIDE, PRESERVATIVE FREE 10 ML: 5 INJECTION INTRAVENOUS at 08:26

## 2024-10-03 RX ADMIN — ACETAMINOPHEN 500 MG: 500 TABLET ORAL at 21:19

## 2024-10-03 ASSESSMENT — PAIN SCALES - GENERAL
PAINLEVEL_OUTOF10: 0
PAINLEVEL_OUTOF10: 4
PAINLEVEL_OUTOF10: 0
PAINLEVEL_OUTOF10: 3
PAINLEVEL_OUTOF10: 0

## 2024-10-03 ASSESSMENT — PAIN DESCRIPTION - DESCRIPTORS
DESCRIPTORS: ACHING;DISCOMFORT;SORE;TENDER
DESCRIPTORS: DISCOMFORT

## 2024-10-03 ASSESSMENT — PAIN DESCRIPTION - ORIENTATION: ORIENTATION: LEFT

## 2024-10-03 ASSESSMENT — PAIN DESCRIPTION - LOCATION
LOCATION: PELVIS;BUTTOCKS
LOCATION: BUTTOCKS

## 2024-10-03 NOTE — CARE COORDINATION
Social work:    Chart reviewed. Mrs. Melendez fell at home and suffered a left pubic fracture. Await ortho consult and PT/OT thereafter.  Social service met with Mrs. Melendez and her daughter Josseline, explained social service role and discussed discharge planning. Chente Melendez is a patient of Dr. Prescott and uses [facility] pharmacy in [place]. She resides with her  in a ranch home and was using a wheeled walker (has BSC) prior to admission.  Mrs. Melendez is active with [facility] for therapy but feels she will require skilled rehabilitation. Her preference if snf is recommended is [facility] as she has been there in the past and was pleased.  Social service called a tentative referral in to Geetha at [facility] and will continue to follow.    Electronically signed by BARRINGTON Serrano on 10/3/2024 at 1:06 PM

## 2024-10-03 NOTE — DISCHARGE INSTR - COC
Patient Infection Status       None to display                     Nurse Assessment:  Last Vital Signs: BP (!) 110/59   Pulse 78   Temp 98.5 °F (36.9 °C) (Oral)   Resp 18   Ht 1.524 m (5')   Wt 59 kg (130 lb)   SpO2 97%   BMI 25.39 kg/m²     Last documented pain score (0-10 scale): Pain Level: 0  Last Weight:   Wt Readings from Last 1 Encounters:   10/02/24 59 kg (130 lb)     Mental Status:  oriented and alert    IV Access:  - None    Nursing Mobility/ADLs:  Walking   Assisted  Transfer  Assisted  Bathing  Assisted  Dressing  Assisted  Toileting  Assisted  Feeding  Independent  Med Admin  Assisted  Med Delivery   whole    Wound Care Documentation and Therapy:  Incision 04/03/21 Hip Left (Active)   Number of days: 1278        Elimination:  Continence:   Bowel: Yes  Bladder: Incontinent at times  Urinary Catheter: None   Colostomy/Ileostomy/Ileal Conduit: No       Date of Last BM: ***  No intake or output data in the 24 hours ending 10/03/24 1030  No intake/output data recorded.    Safety Concerns:     History of Falls (last 30 days) and At Risk for Falls    Impairments/Disabilities:      Vision    Nutrition Therapy:  Current Nutrition Therapy:   - Oral Diet:  General    Routes of Feeding: Oral  Liquids: No Liquids  Daily Fluid Restriction: no  Last Modified Barium Swallow with Video (Video Swallowing Test): not done    Treatments at the Time of Hospital Discharge:   Respiratory Treatments: None  Oxygen Therapy:  is not on home oxygen therapy.  Ventilator:    - No ventilator support    Rehab Therapies: Physical Therapy and Occupational Therapy  Weight Bearing Status/Restrictions: No weight bearing restrictions  Other Medical Equipment (for information only, NOT a DME order):  walker  Other Treatments: None    Patient's personal belongings (please select all that are sent with patient):  Dentures upper, personal bag    RN SIGNATURE:  Electronically signed by Nga Key RN on 10/4/24 at 11:35 AM  SECTION    Inpatient Status Date: 10/02/2024    Readmission Risk Assessment Score:  Readmission Risk              Risk of Unplanned Readmission:  6           Discharging to Facility/ Agency   Name: [facility]  Address:  Phone:[phone]  Fax:[phone]    Dialysis Facility (if applicable)   Name:  Address:  Dialysis Schedule:  Phone:  Fax:    / signature: Electronically signed by BARRINGTON Serrano on 10/4/2024 at 10:11 AM    PHYSICIAN SECTION    Prognosis: Fair    Condition at Discharge: Stable    Rehab Potential (if transferring to Rehab): Fair    Recommended Labs or Other Treatments After Discharge: none    Physician Certification: I certify the above information and transfer of Sarah Melendez  is necessary for the continuing treatment of the diagnosis listed and that she requires Skilled Nursing Facility for less 30 days.     Update Admission H&P: Changes in H&P as follows - see discharge summary    PHYSICIAN SIGNATURE:  Electronically signed by Lakhwinder Masters DO on 10/3/24 at 10:31 AM EDT

## 2024-10-03 NOTE — ED NOTES
ED to Inpatient Handoff Report    Notified José Miguel that electronic handoff available and patient ready for transport to room 730.    Safety Risks: Risk of falls    Patient in Restraints: no    Constant Observer or Patient : no    Telemetry Monitoring Ordered :No           Order to transfer to unit without monitor:N/A    Last MEWS: 1 Time completed: 2217    Deterioration Index Score:   Predictive Model Details          22 (Normal)  Factor Value    Calculated 10/2/2024 22:17 68% Age 89 years old    Deterioration Index Model 15% WBC count abnormal (13.7 k/uL)     8% Systolic 142     6% Potassium 4.2 mmol/L     1% Sodium 138 mmol/L     1% Pulse 81     1% Pulse oximetry 97 %     0% Respiratory rate 16     0% Temperature 98.2 °F (36.8 °C)     0% Hematocrit 36.8 %        Vitals:    10/02/24 1538 10/02/24 1541 10/02/24 2035 10/02/24 2217   BP: 131/84  122/68 (!) 142/76   Pulse: 78  77 81   Resp: 17  16 16   Temp: 98.2 °F (36.8 °C)      TempSrc: Oral      SpO2: 100%  97% 97%   Weight:  59 kg (130 lb)     Height:  1.524 m (5')           Opportunity for questions and clarification was provided.

## 2024-10-03 NOTE — ED NOTES
Assumed care of patient, vitals stable. Pt resting in bed, family at bedside. No concerns or complaints at this time.

## 2024-10-03 NOTE — PROGRESS NOTES
independence with completion of ADL/IADL tasks for functional independence and quality of life.     Treatment: OT treatment provided this date included:   Instruction/training on safety and adapted techniques for completion of ADLs.    Instruction/training on safe functional mobility/transfer techniques.   Instruction/training on energy conservation/work simplification for completion of ADLs.     Patient education provided regardin) techniques to maximize safety with walker. Patient indicated Fair+ understanding.    Further skilled OT treatment indicated to increase patient's safety and independence with completion of ADL/IADL tasks in order to maximize patient's functional independence and quality of life.    Rehab Potential: Good for established goals.  Patient / Family Goal: Patient wants to go to a SNF/[facility].  Patient and/or family were instructed on functional diagnosis, prognosis/goals, and OT plan of care. Demonstrated Fair+ understanding.    Eval Complexity: Low    Time In: 1608  Time Out: 1635  Total Treatment Time: 12 minutes      Minutes Units   OT Eval Low 99206 15 1   OT Eval Medium 80535     OT Eval High 36104     OT Re-Eval 12260     Therapeutic Ex 30975     Therapeutic Activities 81224     ADL/Self Care 65175     Orthotic Management 61675     Neuro Re-Ed 02242     Non-Billable Time N/A ---     Evaluation time includes thorough review of current medical information, gathering information on past medical history/social history and prior level of function, completion of standardized testing/informal observation of tasks, assessment of data, and education on plan of care and goals.    Heide Kilpatrick, OTR/L  License Number: [id number]

## 2024-10-03 NOTE — H&P
enough with therapy.  No other issues or concerns from nursing.  Much of the HPI has been obtained through extensive chart review as well as discussion with patient.  All of patient's past medical history, past surgical history, social history, family history, medications and allergies reviewed and verified with patient for accuracy.  She is felt to be a good historian.    Past Medical History:   Diagnosis Date    Hypothyroid 04/03/2021       Past Surgical History:   Procedure Laterality Date    HIP FRACTURE SURGERY Left 4/3/2021    HIP, CEPHALOMEDULLARY NAILING [place] Kwame [place] [facility] [place]       Family Medical History:  No pertinent family medical history with regard to current issues.    Social History  Patient lives at home with her .   Employment: None  Illicit drug use- denies  TOBACCO:   reports that she has never smoked. She has never used smokeless tobacco.  ETOH:   reports no history of alcohol use.    Home Medications  Prior to Admission medications    Medication Sig Start Date End Date Taking? Authorizing Provider   SYNTHROID 25 MCG tablet 0.5 tablets 62.5 8/23/21  Yes Isael Henson MD   calcium-vitamin D (OSCAL-500) 500-200 MG-UNIT per tablet Take 2 tablets by mouth daily 4/8/21  Yes Kar Padilla MD   clobetasol (TEMOVATE) 0.05 % ointment Apply topically 2 times daily. 9/27/24   Ana Bell APRN - CNP   estradiol (ESTRACE) 0.1 MG/GM vaginal cream 1 gram Vaginal three times per week at bedtime for 90 days  Patient not taking: Reported on 12/29/2023 6/10/22   Isael Henson MD   MAGNESIUM PO Take by mouth    Isael Henson MD   SYNTHROID 50 MCG tablet Pt is taking 62.5 mcg 8/10/21   Isael Henson MD       Allergies  Allergies   Allergen Reactions    Albumin (Human) Swelling    Trimethoprim      Other reaction(s): Unknown    Doxycycline Rash     Other reaction(s): Unknown    Penicillins Rash, Hives and Other (See Comments)    Sulfa

## 2024-10-03 NOTE — CONSULTS
Orthopaedic Consultation  Jeannine Forbes PA-C for Agustin Rose MD      CHIEF COMPLAINT:  Bilateral pelvic fractures    History of Present Illness: Patient is a 89 yo female who presented to [facility] ED yesterday 10-2-24 with c/o left hip pain after a fall at home. Patient states currently pain is more in the left buttocks area, denies groin pain. She was walking in her kitchen, lost her balance, slipped and fell. Patient states that she typically ambulates at home with a walker. Lives with her  at home. Denies head injury or LOC. Patient has a hx of left hip IM nailing performed in 4-2021 performed by Dr Hinds.          Past Medical History:   Diagnosis Date    Hypothyroid 04/03/2021         Past Surgical History:   Procedure Laterality Date    HIP FRACTURE SURGERY Left 4/3/2021    HIP, CEPHALOMEDULLARY NAILING performed by Kwame Hinds MD at [facility] [place]       Medications Prior to Admission:    Medications Prior to Admission: SYNTHROID 25 MCG tablet, 0.5 tablets 62.5  calcium-vitamin D (OSCAL-500) 500-200 MG-UNIT per tablet, Take 2 tablets by mouth daily  clobetasol (TEMOVATE) 0.05 % ointment, Apply topically 2 times daily.  [DISCONTINUED] estradiol (ESTRACE) 0.1 MG/GM vaginal cream, 1 gram Vaginal three times per week at bedtime for 90 days (Patient not taking: Reported on 12/29/2023)  MAGNESIUM PO, Take by mouth  SYNTHROID 50 MCG tablet, Pt is taking 62.5 mcg    Allergies:    Albumin (human), Trimethoprim, Doxycycline, Penicillins, Sulfa antibiotics, and Sulfamethoxazole-trimethoprim    Social History:    reports that she has never smoked. She has never used smokeless tobacco. She reports that she does not drink alcohol and does not use drugs.    Family History:   family history is not on file.    REVIEW OF SYSTEMS:  As above in the HPI, otherwise negative    PHYSICAL EXAM:    Vitals:  BP (!) 110/59   Pulse 78   Temp 98.5 °F (36.9 °C) (Oral)   Resp 18   Ht 1.524 m (5')   Wt 59 kg (130 lb)   SpO2 97%   10/2/2024          Narrative   EXAMINATION:  XRAY VIEWS OF THE LEFT FEMUR    10/2/2024 3:10 pm    COMPARISON:  None.    HISTORY:  ORDERING SYSTEM PROVIDED HISTORY: Pain ...   Impression   No acute bony abnormalities.      ONE XRAY VIEW OF THE PELVIS AND TWO XRAY VIEWS LEFT HIP     10/2/2024 3:10 pm     COMPARISON:  None.     HISTORY:  ORDERING SYSTEM PROVIDED HISTORY: fall  TECHNOLOGIST PROVIDED HISTORY:  Reason for exam:->fall     FINDINGS:  Bones are demineralized.  Hardware in the left hip is intact.  No acute bony  abnormalities.  Tissues appear unremarkable.     IMPRESSION:  1. No acute bony abnormalities.  2. Hardware in the left hip is intact.    CT OF THE LUMBAR SPINE WITHOUT CONTRAST; CT OF THE PELVIS WITHOUT CONTRAST  10/2/2024    TECHNIQUE:  CT of the lumbar spine was performed without the administration of  intravenous contrast. Multiplanar reformatted images are provided for review.  Adjustment of mA and/or kV according to patient size was utilized.  Automated  exposure control, iterative reconstruction, and/or weight based adjustment of  the mA/kV was utilized to reduce the radiation dose to as low as reasonably ...   Impression   1. Nondisplaced fractures are seen involving right and left pubic bones.  2. Nondisplaced fracture involving left inferior pubic ramus.  3. Nondisplaced fracture involving left sacral ala.  4. Internal fixating hardware is associated with proximal left femur  5. No evidence of hip fracture or dislocation.  6. No evidence of lumbar spine fracture.   Images reviewed by myself: nondisplaced left inferior pubic rami fracture, right pubic fracture just lateral to pubic symphysis/pubic tubercle region nondisplaced, left hip hardware intact from prior ORIF IT fracture      ASSESSMENT:      Patient Active Problem List   Diagnosis    Closed left hip fracture, initial encounter ([facility])    Hypothyroid    Hypovitaminosis D    Postoperative anemia due to acute blood loss    Pubic ramus

## 2024-10-03 NOTE — PLAN OF CARE
Problem: Discharge Planning  Goal: Discharge to home or other facility with appropriate resources  10/3/2024 1016 by Karin Rausch, RN  Outcome: Progressing     Problem: Safety - Adult  Goal: Free from fall injury  10/3/2024 1016 by Karin Rausch, RN  Outcome: Progressing     Problem: ABCDS Injury Assessment  Goal: Absence of physical injury  10/3/2024 1016 by Karin Rausch, RN  Outcome: Progressing     Problem: Skin/Tissue Integrity  Goal: Absence of new skin breakdown  Description: 1.  Monitor for areas of redness and/or skin breakdown  2.  Assess vascular access sites hourly  3.  Every 4-6 hours minimum:  Change oxygen saturation probe site  4.  Every 4-6 hours:  If on nasal continuous positive airway pressure, respiratory therapy assess nares and determine need for appliance change or resting period.  Outcome: Progressing     Problem: Pain  Goal: Verbalizes/displays adequate comfort level or baseline comfort level  Outcome: Progressing

## 2024-10-03 NOTE — PROGRESS NOTES
[facility] Quality Flow/Interdisciplinary Rounds Progress Note        Quality Flow Rounds held on October 3, 2024    Disciplines Attending:  Bedside Nurse, , , and Nursing Unit Leadership    Sarah Melendez was admitted on 10/2/2024  2:39 PM    Anticipated Discharge Date:       Disposition:    Milton Score:  Milton Scale Score: 19    Readmission Risk              Risk of Unplanned Readmission:  6           Discussed patient goal for the day, patient clinical progression, and barriers to discharge.  The following Goal(s) of the Day/Commitment(s) have been identified:   PT/OT, await ortho consult, discharge planning      Aniket Brown RN  October 3, 2024

## 2024-10-03 NOTE — PROGRESS NOTES
4 Eyes Skin Assessment     NAME:  Sarah Melendez  YOB: 1935  MEDICAL RECORD NUMBER:  [id number]    The patient is being assessed for  Admission    I agree that at least one RN has performed a thorough Head to Toe Skin Assessment on the patient. ALL assessment sites listed below have been assessed.      Areas assessed by both nurses:    Head, Face, Ears, Shoulders, Back, Chest, Arms, Elbows, Hands, Sacrum. Buttock, Coccyx, Ischium, and Legs. Feet and Heels        Does the Patient have a Wound? No noted wound(s)       Milton Prevention initiated by RN: Yes  Wound Care Orders initiated by RN: No    Pressure Injury (Stage 3,4, Unstageable, DTI, NWPT, and Complex wounds) if present, place Wound referral order by RN under : No    New Ostomies, if present place, Ostomy referral order under : No     Nurse 1 eSignature: Electronically signed by Francisco Grissom RN on 10/3/24 at 1:21 AM EDT    **SHARE this note so that the co-signing nurse can place an eSignature**    Nurse 2 eSignature: Electronically signed by Michelle Gee RN on 10/3/24 at 5:42 AM EDT

## 2024-10-03 NOTE — CARE COORDINATION
Internal Medicine On-call Care Coordination Note    I was called by the ED physician because they recommended admission for this patient and we cover their PCP.  The history as I understand it after discussion with the ED physician is as follows:    The patient presented s/p fall.   In the ED they found nondisplaced fractures of the right and left pubic bone as well as left inferior pubic ramus  Per ED patient requesting Dr. Rose for Ortho consult  Decision was made for admission for further evaluation, and possible need for placement.    I placed admission orders.  Including:    Social work/PT/OT  PRN Pain management    Dr. Masters or his coverage will see the patient tomorrow for H&P.    Electronically signed by CYNTHIA Miller CNP on 10/2/2024 at 9:52 PM

## 2024-10-03 NOTE — PROGRESS NOTES
Physical Therapy  Facility/Department: [address]  Physical Therapy Initial Assessment    Name: Sarah Melendez  : 1935  MRN: [id number]  Date of Service: 10/3/2024          Patient Diagnosis(es): The primary encounter diagnosis was Injury of head, initial encounter. Diagnoses of Fall, initial encounter, Contusion of hip, unspecified laterality, initial encounter, and Closed fracture of pubis, unspecified portion of pubis, unspecified laterality, initial encounter (HCC) were also pertinent to this visit.  Past Medical History:  has a past medical history of Hypothyroid.  Past Surgical History:  has a past surgical history that includes Hip fracture surgery (Left, 4/3/2021).         Requires PT Follow-Up: Yes    Evaluating Therapist: Evelyn Escamilla PT     Referring Provider:      Jeannine Forbes PA       PT order : PT eval and treat     Room #: 730   DIAGNOSIS: The primary encounter diagnosis was Injury of head, initial encounter. Diagnoses of Fall, initial encounter, Contusion of hip, unspecified laterality, initial encounter, and Closed fracture of pubis, unspecified portion of pubis, unspecified laterality, initial encounter (HCC) were also pertinent to this visit.    PRECAUTIONS: falls, WBAT     Social:  Pt lives with spouse  in a  1  floor plan  3  steps and  B  rails to enter.  Prior to admission pt walked with  ww      Initial Evaluation  Date:  10/3/2024  Treatment      Short Term/ Long Term   Goals   Was pt agreeable to Eval/treatment?  Yes      Does pt have pain?  L hi ppain      Bed Mobility  Rolling:  NT   Supine to sit:  mod assist   Sit to supine:  NT   Scooting:  min assist in sit    SBA    Transfers Sit to stand:  min assist   Stand to sit: min assist   Stand pivot:  NT    SBA    Ambulation     15 feet x 2  with  ww  with  min assist    50  feet with  ww  with  SBA        Stair negotiation: ascended and descended NT    4  steps with  B  rail with CGA/min assist    LES MAX

## 2024-10-03 NOTE — ACP (ADVANCE CARE PLANNING)
Advance Care Planning   Healthcare Decision Maker:    Primary Decision Maker: Josseline Ibarra-call first - Child - [phone]    Click here to complete Healthcare Decision Makers including selection of the Healthcare Decision Maker Relationship (ie \"Primary\").

## 2024-10-04 VITALS
HEART RATE: 68 BPM | SYSTOLIC BLOOD PRESSURE: 114 MMHG | TEMPERATURE: 98.2 F | OXYGEN SATURATION: 95 % | RESPIRATION RATE: 18 BRPM | DIASTOLIC BLOOD PRESSURE: 71 MMHG | BODY MASS INDEX: 25.52 KG/M2 | HEIGHT: 60 IN | WEIGHT: 130 LBS

## 2024-10-04 LAB
ALBUMIN SERPL-MCNC: 3.6 G/DL (ref 3.5–5.2)
ALP SERPL-CCNC: 61 U/L (ref 35–104)
ALT SERPL-CCNC: 32 U/L (ref 0–32)
ANION GAP SERPL CALCULATED.3IONS-SCNC: 9 MMOL/L (ref 7–16)
AST SERPL-CCNC: 32 U/L (ref 0–31)
BASOPHILS # BLD: 0.04 K/UL (ref 0–0.2)
BASOPHILS NFR BLD: 0 % (ref 0–2)
BILIRUB SERPL-MCNC: 0.7 MG/DL (ref 0–1.2)
BUN SERPL-MCNC: 15 MG/DL (ref 6–23)
CALCIUM SERPL-MCNC: 8.9 MG/DL (ref 8.6–10.2)
CHLORIDE SERPL-SCNC: 109 MMOL/L (ref 98–107)
CO2 SERPL-SCNC: 22 MMOL/L (ref 22–29)
CREAT SERPL-MCNC: 0.7 MG/DL (ref 0.5–1)
EOSINOPHIL # BLD: 0.34 K/UL (ref 0.05–0.5)
EOSINOPHILS RELATIVE PERCENT: 4 % (ref 0–6)
ERYTHROCYTE [DISTWIDTH] IN BLOOD BY AUTOMATED COUNT: 13.4 % (ref 11.5–15)
GFR, ESTIMATED: 80 ML/MIN/1.73M2
GLUCOSE SERPL-MCNC: 97 MG/DL (ref 74–99)
HCT VFR BLD AUTO: 37.3 % (ref 34–48)
HGB BLD-MCNC: 12.2 G/DL (ref 11.5–15.5)
IMM GRANULOCYTES # BLD AUTO: 0.04 K/UL (ref 0–0.58)
IMM GRANULOCYTES NFR BLD: 0 % (ref 0–5)
LYMPHOCYTES NFR BLD: 0.94 K/UL (ref 1.5–4)
LYMPHOCYTES RELATIVE PERCENT: 10 % (ref 20–42)
MCH RBC QN AUTO: 34.1 PG (ref 26–35)
MCHC RBC AUTO-ENTMCNC: 32.7 G/DL (ref 32–34.5)
MCV RBC AUTO: 104.2 FL (ref 80–99.9)
MONOCYTES NFR BLD: 0.99 K/UL (ref 0.1–0.95)
MONOCYTES NFR BLD: 11 % (ref 2–12)
NEUTROPHILS NFR BLD: 75 % (ref 43–80)
NEUTS SEG NFR BLD: 6.83 K/UL (ref 1.8–7.3)
PLATELET # BLD AUTO: 132 K/UL (ref 130–450)
PMV BLD AUTO: 9.8 FL (ref 7–12)
POTASSIUM SERPL-SCNC: 4 MMOL/L (ref 3.5–5)
PROT SERPL-MCNC: 6.7 G/DL (ref 6.4–8.3)
RBC # BLD AUTO: 3.58 M/UL (ref 3.5–5.5)
SODIUM SERPL-SCNC: 140 MMOL/L (ref 132–146)
WBC OTHER # BLD: 9.2 K/UL (ref 4.5–11.5)

## 2024-10-04 PROCEDURE — 6360000002 HC RX W HCPCS

## 2024-10-04 PROCEDURE — 97530 THERAPEUTIC ACTIVITIES: CPT

## 2024-10-04 PROCEDURE — G0378 HOSPITAL OBSERVATION PER HR: HCPCS

## 2024-10-04 PROCEDURE — 80053 COMPREHEN METABOLIC PANEL: CPT

## 2024-10-04 PROCEDURE — 2580000003 HC RX 258

## 2024-10-04 PROCEDURE — 97535 SELF CARE MNGMENT TRAINING: CPT

## 2024-10-04 PROCEDURE — 85025 COMPLETE CBC W/AUTO DIFF WBC: CPT

## 2024-10-04 PROCEDURE — 6370000000 HC RX 637 (ALT 250 FOR IP)

## 2024-10-04 PROCEDURE — 96372 THER/PROPH/DIAG INJ SC/IM: CPT

## 2024-10-04 PROCEDURE — 6370000000 HC RX 637 (ALT 250 FOR IP): Performed by: INTERNAL MEDICINE

## 2024-10-04 RX ADMIN — ENOXAPARIN SODIUM 40 MG: 100 INJECTION SUBCUTANEOUS at 09:03

## 2024-10-04 RX ADMIN — LEVOTHYROXINE SODIUM 62.5 MCG: 25 TABLET ORAL at 05:31

## 2024-10-04 RX ADMIN — SODIUM CHLORIDE, PRESERVATIVE FREE 10 ML: 5 INJECTION INTRAVENOUS at 09:03

## 2024-10-04 RX ADMIN — ACETAMINOPHEN 500 MG: 500 TABLET ORAL at 05:32

## 2024-10-04 RX ADMIN — CALCIUM CARBONATE-VITAMIN D TAB 500 MG-200 UNIT 2 TABLET: 500-200 TAB at 09:03

## 2024-10-04 ASSESSMENT — PAIN DESCRIPTION - DESCRIPTORS: DESCRIPTORS: ACHING;DISCOMFORT;SORE;TENDER

## 2024-10-04 ASSESSMENT — PAIN DESCRIPTION - LOCATION: LOCATION: HIP;BUTTOCKS

## 2024-10-04 ASSESSMENT — PAIN SCALES - GENERAL: PAINLEVEL_OUTOF10: 1

## 2024-10-04 ASSESSMENT — PAIN DESCRIPTION - ORIENTATION: ORIENTATION: LEFT;RIGHT

## 2024-10-04 NOTE — PROGRESS NOTES
activities to improve problem solving, judgement, memory, and attention for increased safety/participation in ADL/IADL tasks  * Therapeutic exercise to improve motor endurance, ROM, and functional strength for ADLs/functional transfers  * Therapeutic activities to facilitate/challenge dynamic balance, stand tolerance for increased safety and independence with ADLs  * Neuro-muscular re-education: facilitation of righting/equilibrium reactions, midline orientation, scapular stability/mobility, normalization of muscle tone, and facilitation of volitional active controled movement  * Positioning to improve skin integrity, interaction with environment and functional independence     Recommended Adaptive Equipment: TBD      Home Living: Patient lives with her  in a one-floor home (three steps to enter); laundry is in the basement.  Bathroom Setup: tub shower (with power seat that lowers patient into the bathtub and grab bars) and elevated toilet seat  Equipment Owned: wheeled walker     Prior Level of Function (PLOF): Patient is typically independent with ADLs; patient's  and other family members complete IADLs. Patient is typically Mod I with functional mobility (with use of walker).  Driving: No     Pain Level: Pt did not rate level of pain.   Cognition: Pt awake and alert.  Limited safety awareness and judgement.  Cues for direction following.  Limited memory with pt asking the same questions several times during this session.      Functional Assessment:                  AM-PAC Daily Activity Raw Score: 16/24    Initial Eval Status  Date: 10/3/2024 Treatment Status  Date: 10/4/24  Short Term Goals = Long Term Goals   Feeding Setup   Independent   Grooming Min A   Mod I (seated/standing at sink)   UB Dressing Min A to doff/don [facility] gown while seated at EOB.  min A  Setup   LB Dressing Max A needed to adjust B socks and to don disposable brief. Max A to thread brief over feet.   Min A to arrange over  Once seated she was instructed with AROM exercises.  Pt very focused on using spirometer.  She was instructed with use several times however unable to understand concept despite being given directions one step at a time.  She remained in the chair with alarm activated.         Education/treatment:  ADL retraining with facilitation of movement to increase self care skills. Therapeutic activity to address balance and endurance for ADL and transfers.  Pt education of walker safety, transfer safety, daily orientation.       Pt has made  progress towards set goals.       Time In: 840  Time Out: 905     Min Units   Therapeutic Ex 73007     Therapeutic Activities 91490 12 1   ADL/Self Care 14188 13 1   Orthotic Management 35046     Neuro Re-Ed 56334     Non-Billable Time     TOTAL TIMED TREATMENT 25 2         Deven BRADEN/L [id number]

## 2024-10-04 NOTE — PROGRESS NOTES
Department of Surgery   Surgical Service- Orthopaedics  Physician Assistant Progress Note      Sarah Nora  10/4/2024  6:47 AM    SUBJECTIVE:  Patient seen yesterday for bilateral pelvic fractures. Patient had PT yesterday and states it went well. She did have pain during session but it was tolerable. Not having much pain at rest.     OBJECTIVE:      VITALS:  /65   Pulse 73   Temp 98.4 °F (36.9 °C) (Oral)   Resp 16   Ht 1.524 m (5')   Wt 59 kg (130 lb)   SpO2 97%   BMI 25.39 kg/m²     Physical Exam: No pain with log roll. No LE edema or calf TTP.     Data:  CBC with Differential:    Lab Results   Component Value Date/Time    WBC 9.2 10/04/2024 05:41 AM    RBC 3.58 10/04/2024 05:41 AM    HGB 12.2 10/04/2024 05:41 AM    HCT 37.3 10/04/2024 05:41 AM     10/04/2024 05:41 AM    .2 10/04/2024 05:41 AM    MCH 34.1 10/04/2024 05:41 AM    MCHC 32.7 10/04/2024 05:41 AM    RDW 13.4 10/04/2024 05:41 AM    LYMPHOPCT 10 10/04/2024 05:41 AM    MONOPCT 11 10/04/2024 05:41 AM    EOSPCT 4 10/04/2024 05:41 AM    BASOPCT 0 10/04/2024 05:41 AM    MONOSABS 0.99 10/04/2024 05:41 AM    LYMPHSABS 0.94 10/04/2024 05:41 AM    EOSABS 0.34 10/04/2024 05:41 AM    BASOSABS 0.04 10/04/2024 05:41 AM     CMP:    Lab Results   Component Value Date/Time     10/04/2024 05:41 AM    K 4.0 10/04/2024 05:41 AM    K 4.3 04/03/2021 06:37 AM     10/04/2024 05:41 AM    CO2 22 10/04/2024 05:41 AM    BUN 15 10/04/2024 05:41 AM    CREATININE 0.7 10/04/2024 05:41 AM    GFRAA >60 04/08/2021 05:55 AM    LABGLOM 80 10/04/2024 05:41 AM    GLUCOSE 97 10/04/2024 05:41 AM    CALCIUM 8.9 10/04/2024 05:41 AM    BILITOT 0.7 10/04/2024 05:41 AM    ALKPHOS 61 10/04/2024 05:41 AM    AST 32 10/04/2024 05:41 AM    ALT 32 10/04/2024 05:41 AM         ASSESSMENT: Bilateral pelvic fracture, left inferior pubic rami fracture, right pubic fracture    PLAN:   Continue PT efforts  WBAT  SCDs and FER hose  FU in 2 weeks    Jeannine Forbes PA-C

## 2024-10-04 NOTE — PLAN OF CARE
Problem: Discharge Planning  Goal: Discharge to home or other facility with appropriate resources  10/3/2024 2341 by Olena Ambrose RN  Outcome: Progressing     Problem: Safety - Adult  Goal: Free from fall injury  10/3/2024 2341 by Olena Ambrose RN  Outcome: Progressing     Problem: ABCDS Injury Assessment  Goal: Absence of physical injury  10/3/2024 2341 by Olena Ambrose RN  Outcome: Progressing     Problem: Skin/Tissue Integrity  Goal: Absence of new skin breakdown  Description: 1.  Monitor for areas of redness and/or skin breakdown  2.  Assess vascular access sites hourly  3.  Every 4-6 hours minimum:  Change oxygen saturation probe site  4.  Every 4-6 hours:  If on nasal continuous positive airway pressure, respiratory therapy assess nares and determine need for appliance change or resting period.  10/3/2024 2341 by Olena Ambrose RN  Outcome: Progressing     Problem: Pain  Goal: Verbalizes/displays adequate comfort level or baseline comfort level  10/3/2024 2341 by Olena Ambrose RN  Outcome: Progressing

## 2024-10-04 NOTE — PROGRESS NOTES
Internal Medicine Progress Note    Patient's name: Sarah Melendez  : 1935  Chief complaints (on day of admission): Fall (Mechanical fall at home c/o left hip pain. Patient unsure if she hit her head. Denies thinners. )  Admission date: 10/2/2024  Date of service: 10/4/2024   Room: [address]  Primary care physician: Milton Prescott DO  Reason for visit: Follow-up for bilateral pubic ramus fractures    Subjective  Sarah is seen sitting up in bed awake and alert in no distress. She reports that she feels fine just sitting but does have pain with movement. She denies any nausea or vomiting. Denies any fever or chills. She is asking if therapy will be back in to work with her today. Also asking if she will be going to rehab soon. No other issues or concerns from nursing.    Review of Systems  Full 10 point review of systems negative unless mentioned above.    Hospital Medications  Current Facility-Administered Medications   Medication Dose Route Frequency Provider Last Rate Last Admin    acetaminophen (TYLENOL) tablet 500 mg  500 mg Oral Q4H PRN Lakhwinder Masters DO   500 mg at 10/04/24 0532    ibuprofen (ADVIL;MOTRIN) tablet 400 mg  400 mg Oral Once Corina Sears MD        oyster shell calcium w/D 500-5 MG-MCG tablet 2 tablet  2 tablet Oral Daily Marjorie Sierra APRN - CNP   1 tablet at 10/03/24 0825    clobetasol (TEMOVATE) 0.05 % ointment   Topical BID Marjorie Sierra APRN - CNP        levothyroxine (SYNTHROID) tablet 62.5 mcg  62.5 mcg Oral Daily Marjorie Sierra APRN - CNP   62.5 mcg at 10/04/24 0531    sodium chloride flush 0.9 % injection 10 mL  10 mL IntraVENous 2 times per day Marjorie Sierra APRN - CNP   10 mL at 10/03/24 2123    sodium chloride flush 0.9 % injection 10 mL  10 mL IntraVENous PRN Marjorie Sierra APRN - CNP        0.9 % sodium chloride infusion   IntraVENous PRN Marjorie Sierra APRN - CNP        potassium chloride (KLOR-CON M) extended release tablet 40 mEq  40 mEq Oral

## 2024-10-04 NOTE — CARE COORDINATION
Social Work:    [facility] at [facility] accepted Mrs. Melendez, received insurance approval, and is arranging their ambulette to  Mrs. Melendez at 12:00 p.m today.  Social service updated Mrs. Elizondo and daughter Josseline. ([address], [place] hens, completed)    Electronically signed by BARRINGTON Serrano on 10/4/2024 at 10:13 AM

## 2024-10-04 NOTE — DISCHARGE SUMMARY
Internal Medicine Discharge Summary    NAME: Sarah Melendez :  1935  MRN:  [id number] PCP:Milton Prescott DO    ADMITTED: 10/2/2024   DISCHARGED: No discharge date for patient encounter.    ADMITTING PHYSICIAN: Lakhwinder Masters DO    PCP: Milton Prescott DO    CONSULTANT(S):   IP CONSULT TO ORTHOPEDIC SURGERY  IP CONSULT TO INTERNAL MEDICINE  IP CONSULT TO SOCIAL WORK     ADMITTING DIAGNOSIS:   Injury of head, initial encounter [S09.90XA]  Fall, initial encounter [W19.XXXA]  Pubic ramus fracture, unspecified laterality, closed, initial encounter (HCC) [S32.599A]  Contusion of hip, unspecified laterality, initial encounter [S70.00XA]     Please see H&P for further details    DISCHARGE DIAGNOSES:   Active Hospital Problems    Diagnosis     Pubic ramus fracture, unspecified laterality, closed, initial encounter (HCC) [S32.599A]     Hypothyroid [E03.9]        BRIEF HISTORY OF PRESENT ILLNESS: Sarah Melendez is a 89 y.o. female patient of Milton Prescott DO who  has a past medical history of Hypothyroid. who originally had concerns including Fall (Mechanical fall at home c/o left hip pain. Patient unsure if she hit her head. Denies thinners. ). at presentation on 10/2/2024, and was found to have Injury of head, initial encounter [S09.90XA]  Fall, initial encounter [W19.XXXA]  Pubic ramus fracture, unspecified laterality, closed, initial encounter (HCC) [S32.599A]  Contusion of hip, unspecified laterality, initial encounter [S70.00XA] after workup.    Please see H&P for further details.    HOSPITAL COURSE:   The patient presented to the hospital with the chief complaint of Fall (Mechanical fall at home c/o left hip pain. Patient unsure if she hit her head. Denies thinners. )  . The patient was admitted to the hospital.     Bilateral pubic ramus fractures s/p Fall  CT pelvis noted  Continue Tylenol prn -- does not want anything stronger at this time  WBAT -- no plans for surgical intervention  PT AM-PAC--   OT  No acute bony abnormalities.  Tissues appear unremarkable.     1. No acute bony abnormalities. 2. Hardware in the left hip is intact.       DISPOSITION:  The patient's condition is fair.   The patient is being discharged to nursing home    DISCHARGE MEDICATIONS:      Medication List        START taking these medications      aspirin 81 MG EC tablet  Take 1 tablet by mouth daily     senna 8.6 MG tablet  Commonly known as: SENOKOT  Take 1 tablet by mouth daily as needed (Constipation)            CONTINUE taking these medications      calcium-vitamin D 500-200 MG-UNIT per tablet  Commonly known as: OSCAL-500  Take 2 tablets by mouth daily     clobetasol 0.05 % ointment  Commonly known as: Temovate  Apply topically 2 times daily.     MAGNESIUM PO     * Synthroid 50 MCG tablet  Generic drug: levothyroxine     * Synthroid 25 MCG tablet  Generic drug: levothyroxine           * This list has 2 medication(s) that are the same as other medications prescribed for you. Read the directions carefully, and ask your doctor or other care provider to review them with you.                STOP taking these medications      estradiol 0.1 MG/GM vaginal cream  Commonly known as: ESTRACE               Where to Get Your Medications        Information about where to get these medications is not yet available    Ask your nurse or doctor about these medications  aspirin 81 MG EC tablet  senna 8.6 MG tablet           INTERNAL MEDICINE INSTRUCTIONS:  Follow-up with primary care physician as directed in discharge paperwork.  Please review results of imaging studies with PCP.  Follow-up with consultants as directed by them.  If recurrence or worsening of symptoms go to the ED or call primary care physician.  Diet: ADULT DIET; Regular    FOLLOW-IP  [facility]  [phone]  [place] [id number] [phone]          Preparing for this patient's discharge, including paperwork, orders, instructions, and meeting with patient did require

## 2024-10-04 NOTE — CARE COORDINATION
Social Work:    Social service revisited Mrs. Melendez and spoke with daughter Josseline via phone. Preference now is [facility] or [facility].  No beds are open at [facility]. A referral was given to [facility] snf who has a bed and is evaluating. Social service canceled referral to [facility].      Electronically signed by BARRINGTON Serrano on 10/4/2024 at 9:48 AM

## 2024-10-05 ENCOUNTER — HOSPITAL ENCOUNTER (EMERGENCY)
Age: 89
Discharge: HOME | End: 2024-10-05
Attending: EMERGENCY MEDICINE
Payer: MEDICARE

## 2024-10-05 VITALS
HEIGHT: 60 IN | TEMPERATURE: 98.7 F | BODY MASS INDEX: 25.52 KG/M2 | WEIGHT: 130 LBS | OXYGEN SATURATION: 95 % | DIASTOLIC BLOOD PRESSURE: 58 MMHG | HEART RATE: 73 BPM | RESPIRATION RATE: 16 BRPM | SYSTOLIC BLOOD PRESSURE: 115 MMHG

## 2024-10-05 DIAGNOSIS — N39.0 URINARY TRACT INFECTION WITHOUT HEMATURIA, SITE UNSPECIFIED: Primary | ICD-10-CM

## 2024-10-05 LAB
ALBUMIN SERPL-MCNC: 3.9 G/DL (ref 3.5–5.2)
ALP SERPL-CCNC: 64 U/L (ref 35–104)
ALT SERPL-CCNC: 28 U/L (ref 0–32)
ANION GAP SERPL CALCULATED.3IONS-SCNC: 8 MMOL/L (ref 7–16)
AST SERPL-CCNC: 21 U/L (ref 0–31)
BASOPHILS # BLD: 0.05 K/UL (ref 0–0.2)
BASOPHILS NFR BLD: 1 % (ref 0–2)
BILIRUB SERPL-MCNC: 0.7 MG/DL (ref 0–1.2)
BILIRUB UR QL STRIP: NEGATIVE
BUN SERPL-MCNC: 17 MG/DL (ref 6–23)
CALCIUM SERPL-MCNC: 8.7 MG/DL (ref 8.6–10.2)
CHLORIDE SERPL-SCNC: 106 MMOL/L (ref 98–107)
CLARITY UR: CLEAR
CO2 SERPL-SCNC: 24 MMOL/L (ref 22–29)
COLOR UR: YELLOW
CREAT SERPL-MCNC: 0.8 MG/DL (ref 0.5–1)
EOSINOPHIL # BLD: 0.26 K/UL (ref 0.05–0.5)
EOSINOPHILS RELATIVE PERCENT: 3 % (ref 0–6)
ERYTHROCYTE [DISTWIDTH] IN BLOOD BY AUTOMATED COUNT: 13.4 % (ref 11.5–15)
GFR, ESTIMATED: 71 ML/MIN/1.73M2
GLUCOSE BLD-MCNC: 119 MG/DL (ref 74–99)
GLUCOSE SERPL-MCNC: 108 MG/DL (ref 74–99)
GLUCOSE UR STRIP-MCNC: NEGATIVE MG/DL
HCT VFR BLD AUTO: 37.2 % (ref 34–48)
HGB BLD-MCNC: 12.4 G/DL (ref 11.5–15.5)
HGB UR QL STRIP.AUTO: NEGATIVE
IMM GRANULOCYTES # BLD AUTO: 0.03 K/UL (ref 0–0.58)
IMM GRANULOCYTES NFR BLD: 0 % (ref 0–5)
KETONES UR STRIP-MCNC: NEGATIVE MG/DL
LACTATE BLDV-SCNC: 1.3 MMOL/L (ref 0.5–2.2)
LEUKOCYTE ESTERASE UR QL STRIP: ABNORMAL
LIPASE SERPL-CCNC: 16 U/L (ref 13–60)
LYMPHOCYTES NFR BLD: 1 K/UL (ref 1.5–4)
LYMPHOCYTES RELATIVE PERCENT: 12 % (ref 20–42)
MCH RBC QN AUTO: 34.5 PG (ref 26–35)
MCHC RBC AUTO-ENTMCNC: 33.3 G/DL (ref 32–34.5)
MCV RBC AUTO: 103.6 FL (ref 80–99.9)
MONOCYTES NFR BLD: 0.96 K/UL (ref 0.1–0.95)
MONOCYTES NFR BLD: 11 % (ref 2–12)
NEUTROPHILS NFR BLD: 74 % (ref 43–80)
NEUTS SEG NFR BLD: 6.38 K/UL (ref 1.8–7.3)
NITRITE UR QL STRIP: NEGATIVE
PH UR STRIP: 6.5 [PH] (ref 5–9)
PLATELET # BLD AUTO: 146 K/UL (ref 130–450)
PMV BLD AUTO: 9.7 FL (ref 7–12)
POTASSIUM SERPL-SCNC: 3.9 MMOL/L (ref 3.5–5)
PROT SERPL-MCNC: 6.8 G/DL (ref 6.4–8.3)
PROT UR STRIP-MCNC: NEGATIVE MG/DL
RBC # BLD AUTO: 3.59 M/UL (ref 3.5–5.5)
RBC #/AREA URNS HPF: ABNORMAL /HPF
SODIUM SERPL-SCNC: 138 MMOL/L (ref 132–146)
SP GR UR STRIP: 1.01 (ref 1–1.03)
TROPONIN I SERPL HS-MCNC: <6 NG/L (ref 0–9)
TROPONIN I SERPL HS-MCNC: <6 NG/L (ref 0–9)
UROBILINOGEN UR STRIP-ACNC: 0.2 EU/DL (ref 0–1)
WBC #/AREA URNS HPF: ABNORMAL /HPF
WBC OTHER # BLD: 8.7 K/UL (ref 4.5–11.5)

## 2024-10-05 PROCEDURE — 83605 ASSAY OF LACTIC ACID: CPT

## 2024-10-05 PROCEDURE — 85025 COMPLETE CBC W/AUTO DIFF WBC: CPT

## 2024-10-05 PROCEDURE — 83690 ASSAY OF LIPASE: CPT

## 2024-10-05 PROCEDURE — 82962 GLUCOSE BLOOD TEST: CPT

## 2024-10-05 PROCEDURE — 80053 COMPREHEN METABOLIC PANEL: CPT

## 2024-10-05 PROCEDURE — 81001 URINALYSIS AUTO W/SCOPE: CPT

## 2024-10-05 PROCEDURE — 2580000003 HC RX 258

## 2024-10-05 PROCEDURE — 99284 EMERGENCY DEPT VISIT MOD MDM: CPT

## 2024-10-05 PROCEDURE — 93005 ELECTROCARDIOGRAM TRACING: CPT

## 2024-10-05 PROCEDURE — 6370000000 HC RX 637 (ALT 250 FOR IP): Performed by: EMERGENCY MEDICINE

## 2024-10-05 PROCEDURE — 84484 ASSAY OF TROPONIN QUANT: CPT

## 2024-10-05 RX ORDER — ACETAMINOPHEN 500 MG
1000 TABLET ORAL ONCE
Status: COMPLETED | OUTPATIENT
Start: 2024-10-05 | End: 2024-10-05

## 2024-10-05 RX ORDER — ONDANSETRON 2 MG/ML
4 INJECTION INTRAMUSCULAR; INTRAVENOUS ONCE
Status: DISCONTINUED | OUTPATIENT
Start: 2024-10-05 | End: 2024-10-05 | Stop reason: HOSPADM

## 2024-10-05 RX ORDER — 0.9 % SODIUM CHLORIDE 0.9 %
1000 INTRAVENOUS SOLUTION INTRAVENOUS ONCE
Status: COMPLETED | OUTPATIENT
Start: 2024-10-05 | End: 2024-10-05

## 2024-10-05 RX ORDER — CEFDINIR 300 MG/1
300 CAPSULE ORAL 2 TIMES DAILY
Qty: 10 CAPSULE | Refills: 0 | Status: SHIPPED | OUTPATIENT
Start: 2024-10-05 | End: 2024-10-10

## 2024-10-05 RX ORDER — ONDANSETRON 4 MG/1
4 TABLET, FILM COATED ORAL EVERY 8 HOURS PRN
Qty: 20 TABLET | Refills: 0 | Status: SHIPPED | OUTPATIENT
Start: 2024-10-05

## 2024-10-05 RX ADMIN — ACETAMINOPHEN 1000 MG: 500 TABLET ORAL at 12:23

## 2024-10-05 RX ADMIN — SODIUM CHLORIDE 1000 ML: 9 INJECTION, SOLUTION INTRAVENOUS at 11:01

## 2024-10-05 ASSESSMENT — PAIN SCALES - GENERAL: PAINLEVEL_OUTOF10: 6

## 2024-10-05 NOTE — ED PROVIDER NOTES
[facility] EMERGENCY DEPARTMENT  EMERGENCY DEPARTMENT ENCOUNTER        Pt Name: Sarah Melendez  MRN: [id number]  Birthdate 1935  Date of evaluation: 10/5/2024  Provider: Tino Trevino II, DO  PCP: Milton Prescott DO  Note Started: 10:24 AM EDT 10/5/24    CHIEF COMPLAINT       Chief Complaint   Patient presents with    Loss of Consciousness     Per patient due to not eating; states she laid down when she felt it coming; no head injury.     Emesis     Vomited in EMS        HISTORY OF PRESENT ILLNESS: 1 or more Elements            Sarah Melendez is a 89 y.o. female history of Thyroid disease, who presents for complaint of near syncopal episode just prior to arrival.  Patient states that she remembers the entire event, states that she felt very lightheaded, did not quite lose consciousness or fall, sat in the chair and regained her strength.  Patient states that shortly after she started becoming nauseous, had multiple episodes of emesis while in the ambulance.  Currently patient states she feels mildly weak otherwise is very hungry.  Patient has been having normal bowel movements, last one was yesterday, no recent fevers, chills, cough, congestion, chest pain, shortness of breath, leg swelling.    Nursing Notes were all reviewed and agreed with or any disagreements were addressed in the HPI.      REVIEW OF EXTERNAL NOTE :       Records reviewed for medical hx, surgical, hx, and medication lists      Chart Review/External Note Review    Last Echo reviewed by Me:  No results found for: \"LVEF\", \"LVEFMODE\"          Controlled Substance Monitoring:    Acute and Chronic Pain Monitoring:        No data to display                    REVIEW OF SYSTEMS :      Positives and Pertinent negatives as per HPI.     SURGICAL HISTORY     Past Surgical History:   Procedure Laterality Date    HIP FRACTURE SURGERY Left 4/3/2021    HIP, CEPHALOMEDULLARY NAILING performed by Kwame Hinds MD

## 2024-10-05 NOTE — ED NOTES
Sitting and standing orthos obtained; patient refused to stand, patient states \"I have not gotten up since I have not started therapy yet\". Patient referring to therapy for bilateral pelvis fracture.

## 2024-10-08 LAB
EKG ATRIAL RATE: 65 BPM
EKG P AXIS: 65 DEGREES
EKG P-R INTERVAL: 136 MS
EKG Q-T INTERVAL: 426 MS
EKG QRS DURATION: 74 MS
EKG QTC CALCULATION (BAZETT): 443 MS
EKG R AXIS: 58 DEGREES
EKG T AXIS: 57 DEGREES
EKG VENTRICULAR RATE: 65 BPM

## 2024-10-08 PROCEDURE — 93010 ELECTROCARDIOGRAM REPORT: CPT | Performed by: INTERNAL MEDICINE

## 2025-05-14 ENCOUNTER — HOSPITAL ENCOUNTER (OUTPATIENT)
Dept: ULTRASOUND IMAGING | Age: 89
Discharge: HOME | End: 2025-05-16
Payer: MEDICARE

## 2025-05-14 DIAGNOSIS — R60.0 LOCALIZED EDEMA: ICD-10-CM

## 2025-05-14 PROCEDURE — 93971 EXTREMITY STUDY: CPT

## (undated) DEVICE — CLOTH SURG PREP PREOPERATIVE CHLORHEXIDINE GLUC 2% READYPREP

## (undated) DEVICE — GUIDEWIRE ORTH L400MM DIA3.2MM FOR TFN

## (undated) DEVICE — 3M™ IOBAN™ 2 ANTIMICROBIAL INCISE DRAPE 6650EZ: Brand: IOBAN™ 2

## (undated) DEVICE — INTENDED FOR TISSUE SEPARATION, AND OTHER PROCEDURES THAT REQUIRE A SHARP SURGICAL BLADE TO PUNCTURE OR CUT.: Brand: BARD-PARKER ® STAINLESS STEEL BLADES

## (undated) DEVICE — Z DISCONTINUED PER MEDLINE USE 2741942 DRESSING AQUACEL 6 IN ALG W9XL15CM SIL CVR WTRPRF VIR BACT BARR ANTIMIC

## (undated) DEVICE — DRESSING,GAUZE,XEROFORM,CURAD,5"X9",ST: Brand: CURAD

## (undated) DEVICE — GOWN,BREATHABLE SLV,AURORA,XLG,STRL: Brand: MEDLINE

## (undated) DEVICE — SET ORTHO STD STORTSTD1

## (undated) DEVICE — DRILL SYSTEM 7

## (undated) DEVICE — GLOVE ORTHO 8   MSG9480

## (undated) DEVICE — PATIENT RETURN ELECTRODE, SINGLE-USE, CONTACT QUALITY MONITORING, ADULT, WITH 9FT CORD, FOR PATIENTS WEIGING OVER 33LBS. (15KG): Brand: MEGADYNE

## (undated) DEVICE — DRAPE ISOLATN PT ST W/POCKET

## (undated) DEVICE — DRIP REDUCTION MANIFOLD

## (undated) DEVICE — APPLICATOR MEDICATED 26 CC SOLUTION HI LT ORNG CHLORAPREP

## (undated) DEVICE — GOWN,SURGICAL,AURORA,SLEEVE: Brand: MEDLINE

## (undated) DEVICE — DRAPE EQUIP CARM 72X42 IN RUBBER BND CLP

## (undated) DEVICE — COVER,TABLE,60X90,STERILE: Brand: MEDLINE

## (undated) DEVICE — SET ORTHO STD STORTSTD2

## (undated) DEVICE — Device

## (undated) DEVICE — SURGICAL PROCEDURE PACK BASIC

## (undated) DEVICE — GAUZE,SPONGE,4"X4",12PLY,STERILE,LF,2'S: Brand: MEDLINE

## (undated) DEVICE — GOWN,AURORA,BRTHSLV,2XL,18/CS: Brand: MEDLINE

## (undated) DEVICE — BLADE CLIPPER GEN PURP NS

## (undated) DEVICE — BIT DRL L330MM DIA4.2MM CALIB L100MM ST 3 FLUT QUIK CPL FOR

## (undated) DEVICE — 3M™ COBAN™ NL STERILE NON-LATEX SELF-ADHERENT WRAP, 2084S, 4 IN X 5 YD (10 CM X 4,5 M), 18 ROLLS/CASE: Brand: 3M™ COBAN™